# Patient Record
Sex: FEMALE | Race: WHITE | Employment: FULL TIME | URBAN - METROPOLITAN AREA
[De-identification: names, ages, dates, MRNs, and addresses within clinical notes are randomized per-mention and may not be internally consistent; named-entity substitution may affect disease eponyms.]

---

## 2017-05-05 ENCOUNTER — HOSPITAL ENCOUNTER (OUTPATIENT)
Age: 37
Discharge: HOME | End: 2017-05-05
Attending: FAMILY MEDICINE
Payer: COMMERCIAL

## 2017-05-05 VITALS
TEMPERATURE: 98 F | RESPIRATION RATE: 18 BRPM | WEIGHT: 293 LBS | DIASTOLIC BLOOD PRESSURE: 117 MMHG | HEART RATE: 111 BPM | SYSTOLIC BLOOD PRESSURE: 157 MMHG | BODY MASS INDEX: 41.95 KG/M2 | HEIGHT: 70 IN | OXYGEN SATURATION: 98 %

## 2017-05-05 DIAGNOSIS — J02.0 STREPTOCOCCAL SORE THROAT: Primary | ICD-10-CM

## 2017-05-05 PROCEDURE — 87430 STREP A AG IA: CPT | Performed by: FAMILY MEDICINE

## 2017-05-05 PROCEDURE — 99203 OFFICE O/P NEW LOW 30 MIN: CPT

## 2017-05-05 PROCEDURE — 99204 OFFICE O/P NEW MOD 45 MIN: CPT

## 2017-05-05 RX ORDER — AZITHROMYCIN 250 MG/1
TABLET, FILM COATED ORAL
Qty: 1 PACKAGE | Refills: 0 | Status: SHIPPED | OUTPATIENT
Start: 2017-05-05 | End: 2017-05-10

## 2017-05-05 NOTE — ED PROVIDER NOTES
Patient Seen in: [facility]    History   Patient presents with:  Cough/URI    Stated Complaint: cough, sore throat, ichy throat x3 days     HPI    Patient is a 78-year-old female.   Coming in today with complaint of cough, s Genitourinary: Negative. Musculoskeletal: Positive for myalgias. Negative for back pain, joint swelling, arthralgias, gait problem, neck pain and neck stiffness. Skin: Negative. Neurological: Negative.         Positive for stated complaint: cough, cervical adenopathy. Neurological: She is alert and oriented to person, place, and time. Skin: Skin is warm and dry. No rash noted. She is not diaphoretic. No erythema. No pallor. Nursing note and vitals reviewed.             ED Course     Labs Review

## 2017-05-05 NOTE — ED NOTES
Pt educated to not take mucinex & multi symptom cold together, safe OTC meds were recommended to pt by Dr. Sara Ramsey for people who have HTN.

## 2017-05-05 NOTE — ED INITIAL ASSESSMENT (HPI)
Pt c/o productive cough with yellow sputum since last night, post nasal drip and feeling of fluid in ears. Denies ABHAY, fever or chills.

## 2017-10-24 ENCOUNTER — OFFICE VISIT (OUTPATIENT)
Dept: FAMILY MEDICINE CLINIC | Facility: CLINIC | Age: 37
End: 2017-10-24

## 2017-10-24 VITALS
BODY MASS INDEX: 41.02 KG/M2 | WEIGHT: 293 LBS | OXYGEN SATURATION: 98 % | RESPIRATION RATE: 14 BRPM | DIASTOLIC BLOOD PRESSURE: 82 MMHG | TEMPERATURE: 99 F | HEART RATE: 112 BPM | HEIGHT: 71 IN | SYSTOLIC BLOOD PRESSURE: 140 MMHG

## 2017-10-24 DIAGNOSIS — J02.9 SORE THROAT: ICD-10-CM

## 2017-10-24 DIAGNOSIS — H65.192 OTHER ACUTE NONSUPPURATIVE OTITIS MEDIA OF LEFT EAR, RECURRENCE NOT SPECIFIED: Primary | ICD-10-CM

## 2017-10-24 PROCEDURE — 99202 OFFICE O/P NEW SF 15 MIN: CPT | Performed by: NURSE PRACTITIONER

## 2017-10-24 PROCEDURE — 87880 STREP A ASSAY W/OPTIC: CPT | Performed by: NURSE PRACTITIONER

## 2017-10-24 RX ORDER — AZITHROMYCIN 250 MG/1
TABLET, FILM COATED ORAL
Qty: 6 TABLET | Refills: 0 | Status: SHIPPED | OUTPATIENT
Start: 2017-10-24 | End: 2017-12-09 | Stop reason: ALTCHOICE

## 2017-10-25 NOTE — PATIENT INSTRUCTIONS
· It is very important to complete full course of antibiotic.    · Rest and fluids  · Acetaminophen or ibuprofen according to package instructions as needed for pain  · Call or return if symptoms worsen, do not improve in 3 days, or if fever of 100.4 or gre antibiotics. Bacteria can become resistant to antibiotics, and the medicine may cause side effects. For these reasons, your healthcare provider may wait 1 to 3 days to see if the symptoms go away on their own before prescribing an antibiotic.    Your provid ibuprofen to control your fever. Anyone under the age of 24 with a viral illness should not take aspirin because of the increased risk of Reye's syndrome. Keep a daily record of your temperature.    Call your healthcare provider if you have:   a temperatu

## 2017-10-25 NOTE — PROGRESS NOTES
CHIEF COMPLAINT:   Patient presents with:  Sore Throat       HPI:   Robert Sturat is a 29year old female presents to clinic with symptoms of sore throat. Patient has had for 2 days. Symptoms have been consistent since onset.   Patient reports following ass muffled voice, stridor, or uvular deviation. NECK: supple, non-tender  LUNGS: clear to auscultation bilaterally; no wheezes, rales, or rhonchi. Breathing is non labored.   CARDIO: RRR without murmur  GI: good BS's,no masses, hepatosplenomegaly, or tender a viral infection of the nose and throat. For example, a cold might lead to an infection of the ear. Ear infections may also occur when you have allergies.  The viral infection or allergic reaction can cause swelling of the tube between your ear and throat most cases you should feel better in 2 to 3 days. If you are taking an antibiotic and your eardrum has not returned to normal when your provider examines you again, you may need to take a different antibiotic or other medicine.  In this case, it may take more follow-up exams until signs of inflammation and infection have disappeared. How can I prevent an ear infection from occurring? If you tend to get ear infections often, ask your healthcare provider if you need to be checked for allergies.  Getting t

## 2017-12-09 ENCOUNTER — OFFICE VISIT (OUTPATIENT)
Dept: FAMILY MEDICINE CLINIC | Facility: CLINIC | Age: 37
End: 2017-12-09

## 2017-12-09 VITALS
OXYGEN SATURATION: 98 % | HEART RATE: 77 BPM | SYSTOLIC BLOOD PRESSURE: 150 MMHG | TEMPERATURE: 99 F | DIASTOLIC BLOOD PRESSURE: 78 MMHG | RESPIRATION RATE: 18 BRPM | WEIGHT: 293 LBS | BODY MASS INDEX: 45 KG/M2

## 2017-12-09 DIAGNOSIS — K52.9 GASTROENTERITIS: Primary | ICD-10-CM

## 2017-12-09 DIAGNOSIS — R03.0 ELEVATED BP WITHOUT DIAGNOSIS OF HYPERTENSION: ICD-10-CM

## 2017-12-09 PROCEDURE — 99213 OFFICE O/P EST LOW 20 MIN: CPT | Performed by: NURSE PRACTITIONER

## 2017-12-09 NOTE — PROGRESS NOTES
CHIEF COMPLAINT:   Patient presents with:  Flu: C/O possible stomach flu. Symptoms x 3-4 days. HPI:   Jennifer Soria is a 40year old female who presents for complaints of stomach flu type symptoms for about 4 days.   Had diarrhea/body aches/chills f congestion, or sore throat  CARDIOVASCULAR: denies chest pain or palpitations  RESPIRATORY: denies shortness of breath, cough or wheezing  GI:See HPI  NEURO: denies headaches, loss of bowel or bladder control    EXAM:   /78   Pulse 77   Temp 98.8 °F her recent ED visit as well. This was closely monitored w/ her last PCP she says. - Resume home monitoring.  - Highly encouraged to establish care w/ a new PCP- list given.   Per pt, recently moved here from [facility] ED if ever associated chest roberto

## 2017-12-18 ENCOUNTER — OFFICE VISIT (OUTPATIENT)
Dept: FAMILY MEDICINE CLINIC | Facility: CLINIC | Age: 37
End: 2017-12-18

## 2017-12-18 VITALS
BODY MASS INDEX: 42.42 KG/M2 | HEIGHT: 69.5 IN | SYSTOLIC BLOOD PRESSURE: 130 MMHG | RESPIRATION RATE: 18 BRPM | HEART RATE: 100 BPM | TEMPERATURE: 98 F | WEIGHT: 293 LBS | DIASTOLIC BLOOD PRESSURE: 90 MMHG

## 2017-12-18 DIAGNOSIS — A08.4 VIRAL GASTROENTERITIS: Primary | ICD-10-CM

## 2017-12-18 DIAGNOSIS — M54.6 ACUTE LEFT-SIDED THORACIC BACK PAIN: ICD-10-CM

## 2017-12-18 PROCEDURE — 99203 OFFICE O/P NEW LOW 30 MIN: CPT | Performed by: FAMILY MEDICINE

## 2017-12-18 RX ORDER — ETODOLAC 400 MG/1
400 TABLET, FILM COATED ORAL 2 TIMES DAILY
Qty: 28 TABLET | Refills: 0 | Status: SHIPPED | OUTPATIENT
Start: 2017-12-18 | End: 2018-01-01

## 2017-12-18 NOTE — PROGRESS NOTES
[facility] Family Medicine Office Note  Chief Complaint:   Patient presents with:  Urgent Care F/u: seen 12/9/17 in urgent care, abd pain      HPI:   This is a 40year old female coming in for establishment of care and follow up of urgent care vi pain, chest pressure or chest discomfort. No palpitations or edema.   Denies any dyspnea on exertion or at rest  RESPIRATORY:  Denies shortness of breath, cough  GASTROINTESTINAL:  Denies any abdominal pain, nausea, vomiting, diarrhea  NEUROLOGICAL:  Denies Health Maintenance:  Annual Physical due on 11/30/1983  Annual Depression Screen due on 11/30/1993  Pap Smear,3 Years due on 11/30/2012  Influenza Vaccine(1) due on 09/01/2017    Patient/Caregiver Education: Patient/Caregiver Education: There are n

## 2018-01-21 ENCOUNTER — APPOINTMENT (OUTPATIENT)
Dept: CT IMAGING | Facility: HOSPITAL | Age: 38
End: 2018-01-21
Attending: EMERGENCY MEDICINE
Payer: COMMERCIAL

## 2018-01-21 ENCOUNTER — APPOINTMENT (OUTPATIENT)
Dept: GENERAL RADIOLOGY | Facility: HOSPITAL | Age: 38
End: 2018-01-21
Attending: EMERGENCY MEDICINE
Payer: COMMERCIAL

## 2018-01-21 ENCOUNTER — HOSPITAL ENCOUNTER (EMERGENCY)
Facility: HOSPITAL | Age: 38
Discharge: HOME | End: 2018-01-21
Attending: EMERGENCY MEDICINE
Payer: COMMERCIAL

## 2018-01-21 VITALS
DIASTOLIC BLOOD PRESSURE: 90 MMHG | BODY MASS INDEX: 41.95 KG/M2 | RESPIRATION RATE: 14 BRPM | HEIGHT: 70 IN | SYSTOLIC BLOOD PRESSURE: 153 MMHG | HEART RATE: 83 BPM | TEMPERATURE: 98 F | OXYGEN SATURATION: 96 % | WEIGHT: 293 LBS

## 2018-01-21 DIAGNOSIS — R06.02 SHORTNESS OF BREATH: Primary | ICD-10-CM

## 2018-01-21 DIAGNOSIS — I10 HYPERTENSION, UNSPECIFIED TYPE: ICD-10-CM

## 2018-01-21 LAB
ALBUMIN SERPL-MCNC: 3.7 G/DL (ref 3.5–4.8)
ALP LIVER SERPL-CCNC: 74 U/L (ref 37–98)
ALT SERPL-CCNC: 28 U/L (ref 14–54)
AST SERPL-CCNC: 13 U/L (ref 15–41)
BASOPHILS # BLD AUTO: 0.05 X10(3) UL (ref 0–0.1)
BASOPHILS NFR BLD AUTO: 0.4 %
BILIRUB SERPL-MCNC: 0.3 MG/DL (ref 0.1–2)
BILIRUB UR QL STRIP.AUTO: NEGATIVE
BUN BLD-MCNC: 11 MG/DL (ref 8–20)
CALCIUM BLD-MCNC: 9 MG/DL (ref 8.3–10.3)
CHLORIDE: 108 MMOL/L (ref 101–111)
CLARITY UR REFRACT.AUTO: CLEAR
CO2: 25 MMOL/L (ref 22–32)
COLOR UR AUTO: YELLOW
CREAT BLD-MCNC: 0.71 MG/DL (ref 0.55–1.02)
EOSINOPHIL # BLD AUTO: 0.18 X10(3) UL (ref 0–0.3)
EOSINOPHIL NFR BLD AUTO: 1.4 %
ERYTHROCYTE [DISTWIDTH] IN BLOOD BY AUTOMATED COUNT: 14.5 % (ref 11.5–16)
GLUCOSE BLD-MCNC: 99 MG/DL (ref 70–99)
GLUCOSE UR STRIP.AUTO-MCNC: NEGATIVE MG/DL
HCT VFR BLD AUTO: 41.9 % (ref 34–50)
HGB BLD-MCNC: 13.3 G/DL (ref 12–16)
IMMATURE GRANULOCYTE COUNT: 0.05 X10(3) UL (ref 0–1)
IMMATURE GRANULOCYTE RATIO %: 0.4 %
KETONES UR STRIP.AUTO-MCNC: NEGATIVE MG/DL
LYMPHOCYTES # BLD AUTO: 3.08 X10(3) UL (ref 0.9–4)
LYMPHOCYTES NFR BLD AUTO: 24.3 %
M PROTEIN MFR SERPL ELPH: 8.2 G/DL (ref 6.1–8.3)
MCH RBC QN AUTO: 25.9 PG (ref 27–33.2)
MCHC RBC AUTO-ENTMCNC: 31.7 G/DL (ref 31–37)
MCV RBC AUTO: 81.7 FL (ref 81–100)
MONOCYTES # BLD AUTO: 0.55 X10(3) UL (ref 0.1–0.6)
MONOCYTES NFR BLD AUTO: 4.3 %
NEUTROPHIL ABS PRELIM: 8.76 X10 (3) UL (ref 1.3–6.7)
NEUTROPHILS # BLD AUTO: 8.76 X10(3) UL (ref 1.3–6.7)
NEUTROPHILS NFR BLD AUTO: 69.2 %
NITRITE UR QL STRIP.AUTO: NEGATIVE
PH UR STRIP.AUTO: 5 [PH] (ref 4.5–8)
PLATELET # BLD AUTO: 419 10(3)UL (ref 150–450)
POCT LOT NUMBER: NORMAL
POCT URINE PREGNANCY: NEGATIVE
POTASSIUM SERPL-SCNC: 3.7 MMOL/L (ref 3.6–5.1)
PRO-BETA NATRIURETIC PEPTIDE: 82 PG/ML (ref ?–125)
PROT UR STRIP.AUTO-MCNC: NEGATIVE MG/DL
RBC # BLD AUTO: 5.13 X10(6)UL (ref 3.8–5.1)
RBC UR QL AUTO: NEGATIVE
RED CELL DISTRIBUTION WIDTH-SD: 42.9 FL (ref 35.1–46.3)
SODIUM SERPL-SCNC: 141 MMOL/L (ref 136–144)
SP GR UR STRIP.AUTO: 1.02 (ref 1–1.03)
TROPONIN: <0.046 NG/ML (ref ?–0.05)
TROPONIN: <0.046 NG/ML (ref ?–0.05)
TSI SER-ACNC: 2.9 MIU/ML (ref 0.35–5.5)
UROBILINOGEN UR STRIP.AUTO-MCNC: <2 MG/DL
WBC # BLD AUTO: 12.7 X10(3) UL (ref 4–13)

## 2018-01-21 PROCEDURE — 81001 URINALYSIS AUTO W/SCOPE: CPT | Performed by: EMERGENCY MEDICINE

## 2018-01-21 PROCEDURE — 99285 EMERGENCY DEPT VISIT HI MDM: CPT

## 2018-01-21 PROCEDURE — 71275 CT ANGIOGRAPHY CHEST: CPT | Performed by: EMERGENCY MEDICINE

## 2018-01-21 PROCEDURE — 84443 ASSAY THYROID STIM HORMONE: CPT | Performed by: EMERGENCY MEDICINE

## 2018-01-21 PROCEDURE — 36415 COLL VENOUS BLD VENIPUNCTURE: CPT

## 2018-01-21 PROCEDURE — 87086 URINE CULTURE/COLONY COUNT: CPT | Performed by: EMERGENCY MEDICINE

## 2018-01-21 PROCEDURE — 81025 URINE PREGNANCY TEST: CPT

## 2018-01-21 PROCEDURE — 71045 X-RAY EXAM CHEST 1 VIEW: CPT | Performed by: EMERGENCY MEDICINE

## 2018-01-21 PROCEDURE — 85025 COMPLETE CBC W/AUTO DIFF WBC: CPT | Performed by: EMERGENCY MEDICINE

## 2018-01-21 PROCEDURE — 83880 ASSAY OF NATRIURETIC PEPTIDE: CPT | Performed by: EMERGENCY MEDICINE

## 2018-01-21 PROCEDURE — 84484 ASSAY OF TROPONIN QUANT: CPT | Performed by: EMERGENCY MEDICINE

## 2018-01-21 PROCEDURE — 80053 COMPREHEN METABOLIC PANEL: CPT | Performed by: EMERGENCY MEDICINE

## 2018-01-21 PROCEDURE — 93010 ELECTROCARDIOGRAM REPORT: CPT

## 2018-01-21 PROCEDURE — 93005 ELECTROCARDIOGRAM TRACING: CPT

## 2018-01-21 RX ORDER — HYDROMORPHONE HYDROCHLORIDE 1 MG/ML
INJECTION, SOLUTION INTRAMUSCULAR; INTRAVENOUS; SUBCUTANEOUS
Status: DISCONTINUED
Start: 2018-01-21 | End: 2018-01-21 | Stop reason: WASHOUT

## 2018-01-21 NOTE — ED INITIAL ASSESSMENT (HPI)
Pt complains of SOB for the last couple of days. She has a stuffy nose and \"clogged ears. \" Today she had some pressure under her breasts. The pain under her breast is intermittent, it has not gotten worse. Denies nausea.

## 2018-01-21 NOTE — ED PROVIDER NOTES
Patient Seen in: [facility] Emergency Department    History   Patient presents with:  Fatigue (constitutional, neurologic)  Dyspnea ABHAY SOB (respiratory)    Stated Complaint: fatigue    HPI    28-year-old with a history of recurrent bronchitis and o normal limits. Mucous members are moist.   Cardiovascular: Tachycardia, regular rhythm, normal S1-S2. Respiratory: Lungs are clear to auscultation bilaterally. Abdomen: Soft, nontender, nondistended. Back: No CVA tenderness.    Extremities: No pitting T-wave inversions    Chest x-ray: No acute process. Calcified granuloma. FINDINGS:  Cardiac size and pulmonary vasculature are within normal limits. No pleural effusions. No pneumothorax.   Calcified granuloma.        =====  CONCLUSION:  No acute findings normal.  Remainder of labs are unremarkable. Well-appearing 18-year-old presents with some mild shortness of breath and fatigue, other than T-wave inversions on her EKG and possible left atrial enlargement on her EKG her workup is quite reassuring.   She

## 2018-01-22 LAB
ATRIAL RATE: 112 BPM
ATRIAL RATE: 95 BPM
P AXIS: 28 DEGREES
P AXIS: 40 DEGREES
P-R INTERVAL: 150 MS
P-R INTERVAL: 164 MS
Q-T INTERVAL: 326 MS
Q-T INTERVAL: 354 MS
QRS DURATION: 74 MS
QRS DURATION: 76 MS
QTC CALCULATION (BEZET): 444 MS
QTC CALCULATION (BEZET): 444 MS
R AXIS: -10 DEGREES
R AXIS: 23 DEGREES
T AXIS: -18 DEGREES
T AXIS: 2 DEGREES
VENTRICULAR RATE: 112 BPM
VENTRICULAR RATE: 95 BPM

## 2018-01-24 NOTE — ED PROVIDER NOTES
I called the patient at home to find out how she was feeling. She states she is feeling okay. She still having some soreness under her breasts bilaterally. Her breathing is okay. No other new or worsening symptoms.   She made an appointment to follow-up

## 2018-01-31 ENCOUNTER — TELEPHONE (OUTPATIENT)
Dept: FAMILY MEDICINE CLINIC | Facility: CLINIC | Age: 38
End: 2018-01-31

## 2018-01-31 ENCOUNTER — OFFICE VISIT (OUTPATIENT)
Dept: FAMILY MEDICINE CLINIC | Facility: CLINIC | Age: 38
End: 2018-01-31

## 2018-01-31 VITALS
DIASTOLIC BLOOD PRESSURE: 84 MMHG | TEMPERATURE: 97 F | RESPIRATION RATE: 16 BRPM | HEIGHT: 69.5 IN | SYSTOLIC BLOOD PRESSURE: 130 MMHG | BODY MASS INDEX: 42.42 KG/M2 | HEART RATE: 96 BPM | WEIGHT: 293 LBS

## 2018-01-31 DIAGNOSIS — R07.81 RIB PAIN ON LEFT SIDE: Primary | ICD-10-CM

## 2018-01-31 PROCEDURE — 99214 OFFICE O/P EST MOD 30 MIN: CPT | Performed by: FAMILY MEDICINE

## 2018-01-31 RX ORDER — ETODOLAC 400 MG/1
400 TABLET, FILM COATED ORAL 2 TIMES DAILY
Qty: 28 TABLET | Refills: 0 | Status: SHIPPED | OUTPATIENT
Start: 2018-01-31 | End: 2018-02-14

## 2018-01-31 NOTE — TELEPHONE ENCOUNTER
Lm for pt to cb. This may be f/u on her recent ER and cardio visit. I stated pt should keep her appt today if she is not able to reach us prior.

## 2018-01-31 NOTE — PROGRESS NOTES
[facility] Family Medicine Office Note  Chief Complaint:   Patient presents with:  ER F/U: f/u rib pain      HPI:   This is a 40year old female coming in for ER follow-up for left-sided rib pain and chest pain.   Patient had an extensive workup o on exertion or at rest  RESPIRATORY:  Denies shortness of breath, cough  GASTROINTESTINAL:  Denies any abdominal pain, nausea, vomiting, constipation, diarrhea. + left flank pain  NEUROLOGICAL:  Denies headache, dizziness, syncope.   MUSCULOSKELETAL:  Chauncey Pfeiffer Depression Screen due on 11/30/1993  Pap Smear,3 Years due on 11/30/2012  Influenza Vaccine(1) due on 09/01/2017    Patient/Caregiver Education: Patient/Caregiver Education: There are no barriers to learning. Medical education done.    Outcome: Patient verb

## 2018-01-31 NOTE — TELEPHONE ENCOUNTER
Pt called back and confirmed below. Still having soreness under breast area, wonders if stress related. Saw cardio yesterday. Pt in no current distress. She will keep appt.

## 2018-01-31 NOTE — TELEPHONE ENCOUNTER
Pt made appt on mychart for back pain and in rib area   s/w triage as appt is soon advised to talk to Dr. Elizabeth Infante he advised to triage pt     Message     Appointment For: Petrona Freedman ([id number])   Visit Type: Connie Torres ([id number])      1/31/2018    3:15 PM

## 2018-02-07 ENCOUNTER — OFFICE VISIT (OUTPATIENT)
Dept: FAMILY MEDICINE CLINIC | Facility: CLINIC | Age: 38
End: 2018-02-07

## 2018-02-07 ENCOUNTER — APPOINTMENT (OUTPATIENT)
Dept: LAB | Age: 38
End: 2018-02-07
Attending: FAMILY MEDICINE
Payer: COMMERCIAL

## 2018-02-07 VITALS
RESPIRATION RATE: 16 BRPM | BODY MASS INDEX: 42.42 KG/M2 | SYSTOLIC BLOOD PRESSURE: 130 MMHG | TEMPERATURE: 98 F | DIASTOLIC BLOOD PRESSURE: 80 MMHG | HEART RATE: 68 BPM | WEIGHT: 293 LBS | HEIGHT: 69.5 IN

## 2018-02-07 DIAGNOSIS — E66.01 MORBID OBESITY WITH BMI OF 45.0-49.9, ADULT (HCC): ICD-10-CM

## 2018-02-07 DIAGNOSIS — Z00.00 ROUTINE GENERAL MEDICAL EXAMINATION AT A HEALTH CARE FACILITY: ICD-10-CM

## 2018-02-07 DIAGNOSIS — Z00.00 ROUTINE GENERAL MEDICAL EXAMINATION AT A HEALTH CARE FACILITY: Primary | ICD-10-CM

## 2018-02-07 DIAGNOSIS — E55.9 VITAMIN D DEFICIENCY: Primary | ICD-10-CM

## 2018-02-07 LAB
25-HYDROXYVITAMIN D (TOTAL): 11.5 NG/ML (ref 30–100)
CHOLEST SMN-MCNC: 135 MG/DL (ref ?–200)
HDLC SERPL-MCNC: 42 MG/DL (ref 45–?)
HDLC SERPL: 3.21 {RATIO} (ref ?–4.44)
LDLC SERPL CALC-MCNC: 80 MG/DL (ref ?–130)
NONHDLC SERPL-MCNC: 93 MG/DL (ref ?–130)
TRIGL SERPL-MCNC: 65 MG/DL (ref ?–150)
VLDLC SERPL CALC-MCNC: 13 MG/DL (ref 5–40)

## 2018-02-07 PROCEDURE — 99395 PREV VISIT EST AGE 18-39: CPT | Performed by: FAMILY MEDICINE

## 2018-02-07 PROCEDURE — 36415 COLL VENOUS BLD VENIPUNCTURE: CPT | Performed by: FAMILY MEDICINE

## 2018-02-07 PROCEDURE — 82306 VITAMIN D 25 HYDROXY: CPT | Performed by: FAMILY MEDICINE

## 2018-02-07 PROCEDURE — 80061 LIPID PANEL: CPT | Performed by: FAMILY MEDICINE

## 2018-02-07 RX ORDER — ERGOCALCIFEROL 1.25 MG/1
50000 CAPSULE ORAL WEEKLY
Qty: 24 CAPSULE | Refills: 0 | Status: SHIPPED | OUTPATIENT
Start: 2018-02-07 | End: 2018-03-09

## 2018-02-07 NOTE — PROGRESS NOTES
HPI:   Marj Fitzgerald is a 40year old female who presents for a complete physical exam. Symptoms: denies discharge, itching, burning or dysuria, periods are regular. Patient complains of nothing today. Denies any recent illnesses or hospitalizations.   Lenny Bustillo Leukocyte Esterase Urine Trace (A) Negative   WBC Urine 1-4 <5 /HPF   RBC URINE 0-2 0 - 2 /HPF   Bacteria Urine None Seen None Seen   Squamous Epi.  Cells Small Small /LPF   Renal Tubular Epithelial Cells None Seen Small /LPF   Transitional Cells None See 3. 08 0.90 - 4.00 x10(3) uL   Monocyte Absolute 0.55 0.10 - 0.60 x10(3) uL   Eosinophil Absolute 0.18 0.00 - 0.30 x10(3) uL   Basophil Absolute 0.05 0.00 - 0.10 x10(3) uL   Immature Granulocyte Absolute 0.05 0.00 - 1.00 x10(3) uL   Neutrophil % 69.2 %   Lym Position: Sitting, Cuff Size: adult)   Pulse 68   Temp 98.1 °F (36.7 °C) (Oral)   Resp 16   Ht 69.5\"   Wt (!) 314 lb   LMP 01/13/2018   BMI 45.70 kg/m²   Body mass index is 45.7 kg/m².    GENERAL: well developed, morbidly obese, in no apparent distress  SK

## 2018-04-02 ENCOUNTER — HOSPITAL ENCOUNTER (OUTPATIENT)
Dept: CT IMAGING | Facility: HOSPITAL | Age: 38
Discharge: HOME | End: 2018-04-02
Attending: INTERNAL MEDICINE
Payer: COMMERCIAL

## 2018-04-02 DIAGNOSIS — R07.9 CHEST PAIN, UNSPECIFIED TYPE: ICD-10-CM

## 2018-04-02 DIAGNOSIS — R94.39 EQUIVOCAL STRESS TEST: ICD-10-CM

## 2018-04-02 DIAGNOSIS — R94.31 ABNORMAL EKG: ICD-10-CM

## 2018-04-02 PROCEDURE — 82565 ASSAY OF CREATININE: CPT

## 2018-04-02 PROCEDURE — 75574 CT ANGIO HRT W/3D IMAGE: CPT | Performed by: INTERNAL MEDICINE

## 2018-04-02 RX ORDER — NITROGLYCERIN 0.4 MG/1
TABLET SUBLINGUAL
Status: COMPLETED
Start: 2018-04-02 | End: 2018-04-02

## 2018-04-02 RX ADMIN — NITROGLYCERIN 0.4 MG: 0.4 TABLET SUBLINGUAL at 09:45:00

## 2018-11-21 ENCOUNTER — OFFICE VISIT (OUTPATIENT)
Dept: FAMILY MEDICINE CLINIC | Facility: CLINIC | Age: 38
End: 2018-11-21
Payer: COMMERCIAL

## 2018-11-21 VITALS
SYSTOLIC BLOOD PRESSURE: 146 MMHG | HEART RATE: 90 BPM | HEIGHT: 70 IN | WEIGHT: 293 LBS | OXYGEN SATURATION: 99 % | DIASTOLIC BLOOD PRESSURE: 82 MMHG | BODY MASS INDEX: 41.95 KG/M2 | TEMPERATURE: 98 F

## 2018-11-21 DIAGNOSIS — R03.0 ELEVATED BLOOD PRESSURE READING: ICD-10-CM

## 2018-11-21 DIAGNOSIS — J06.9 VIRAL URI: ICD-10-CM

## 2018-11-21 DIAGNOSIS — H66.001 ACUTE SUPPURATIVE OTITIS MEDIA OF RIGHT EAR WITHOUT SPONTANEOUS RUPTURE OF TYMPANIC MEMBRANE, RECURRENCE NOT SPECIFIED: Primary | ICD-10-CM

## 2018-11-21 PROCEDURE — 99213 OFFICE O/P EST LOW 20 MIN: CPT | Performed by: NURSE PRACTITIONER

## 2018-11-21 RX ORDER — AZITHROMYCIN 250 MG/1
TABLET, FILM COATED ORAL
Qty: 6 TABLET | Refills: 0 | Status: SHIPPED | OUTPATIENT
Start: 2018-11-21 | End: 2019-02-24

## 2018-11-21 NOTE — PATIENT INSTRUCTIONS
Middle Ear Infection (Adult)  You have an infection of the middle ear, the space behind the eardrum. This is also called acute otitis media (AOM). Sometimes it is caused by the common cold.  This is because congestion can block the internal passage (eusta You have a viral upper respiratory illness (URI), which is another term for the common cold. This illness is contagious during the first few days. It is spread through the air by coughing and sneezing.  It may also be spread by direct contact (touching the · Cough with lots of colored sputum (mucus)  · Severe headache; face, neck, or ear pain  · Difficulty swallowing due to throat pain  · Fever of 100.4°F (38°C) or higher, or as directed by your healthcare provider  Call 911  Call 911 if any of these occur:

## 2018-11-21 NOTE — PROGRESS NOTES
CHIEF COMPLAINT:   Patient presents with:  Ear Problem: trouble hearing from right ear, dry and itchy throat, drainage x 5 dys       HPI:   Polo Gurrola is a 40year old female who presents for upper respiratory symptoms for  4 days.  Patient re THROAT: Oral mucosa pink, moist. Posterior pharynx is non erythematous. no1 exudates. Tonsils 1/4. NECK: Supple, non-tender  LUNGS: clear to auscultation bilaterally, no wheezes or rhonchi. Breathing is non labored.   CARDIO: RRR without murmur  EXTREMIT You have an infection of the middle ear, the space behind the eardrum. This is also called acute otitis media (AOM). Sometimes it is caused by the common cold.  This is because congestion can block the internal passage (eustachian tube) that drains fluid fr You have a viral upper respiratory illness (URI), which is another term for the common cold. This illness is contagious during the first few days. It is spread through the air by coughing and sneezing.  It may also be spread by direct contact (touching the · Cough with lots of colored sputum (mucus)  · Severe headache; face, neck, or ear pain  · Difficulty swallowing due to throat pain  · Fever of 100.4°F (38°C) or higher, or as directed by your healthcare provider  Call 911  Call 911 if any of these occur:

## 2019-02-24 ENCOUNTER — OFFICE VISIT (OUTPATIENT)
Dept: FAMILY MEDICINE CLINIC | Facility: CLINIC | Age: 39
End: 2019-02-24
Payer: COMMERCIAL

## 2019-02-24 VITALS
HEIGHT: 70 IN | SYSTOLIC BLOOD PRESSURE: 132 MMHG | OXYGEN SATURATION: 98 % | RESPIRATION RATE: 18 BRPM | TEMPERATURE: 98 F | WEIGHT: 293 LBS | DIASTOLIC BLOOD PRESSURE: 82 MMHG | BODY MASS INDEX: 41.95 KG/M2 | HEART RATE: 82 BPM

## 2019-02-24 DIAGNOSIS — J06.9 VIRAL UPPER RESPIRATORY TRACT INFECTION: ICD-10-CM

## 2019-02-24 DIAGNOSIS — R05.9 COUGH: Primary | ICD-10-CM

## 2019-02-24 PROCEDURE — 99213 OFFICE O/P EST LOW 20 MIN: CPT | Performed by: FAMILY MEDICINE

## 2019-02-26 NOTE — PROGRESS NOTES
CHIEF COMPLAINT:   Patient presents with:  Cough: intermittently productive, worse in the AM, some PND - x9 days      HPI:   Barbara Jimenez is a 41year old female who presents for upper respiratory symptoms for  9 days.  Patient reports sore thro intact  EARS: TM's pearly, no bulging, noretraction,no fluid, bony landmarks present  NOSE: Nostrils patent, clear nasal discharge, nasal mucosa pink and boggy  THROAT: Oral mucosa pink, moist. Posterior pharynx is not erythematous. no exudates.  Tonsils 2/

## 2019-02-26 NOTE — PATIENT INSTRUCTIONS
Continue to monitor symptoms for now. Use otc meds for cough:  Delsym for cough, benadryl at bedtime to reduce drainage if needed. Warm tea with honey, cough lozenges, vaporizers.    Consider applying gold's vapo-rub or eucalpytus oil to chest and feet

## 2019-09-23 ENCOUNTER — TELEPHONE (OUTPATIENT)
Dept: FAMILY MEDICINE CLINIC | Facility: CLINIC | Age: 39
End: 2019-09-23

## 2019-09-23 ENCOUNTER — OFFICE VISIT (OUTPATIENT)
Dept: FAMILY MEDICINE CLINIC | Facility: CLINIC | Age: 39
End: 2019-09-23
Payer: COMMERCIAL

## 2019-09-23 VITALS
RESPIRATION RATE: 24 BRPM | WEIGHT: 293 LBS | DIASTOLIC BLOOD PRESSURE: 82 MMHG | SYSTOLIC BLOOD PRESSURE: 132 MMHG | HEART RATE: 88 BPM | HEIGHT: 70 IN | BODY MASS INDEX: 41.95 KG/M2

## 2019-09-23 DIAGNOSIS — S09.90XA INJURY OF HEAD, INITIAL ENCOUNTER: ICD-10-CM

## 2019-09-23 DIAGNOSIS — R51.9 PRESSURE IN HEAD: Primary | ICD-10-CM

## 2019-09-23 PROCEDURE — 99214 OFFICE O/P EST MOD 30 MIN: CPT | Performed by: FAMILY MEDICINE

## 2019-09-23 RX ORDER — CEFDINIR 300 MG/1
300 CAPSULE ORAL 2 TIMES DAILY
Qty: 20 CAPSULE | Refills: 0 | Status: SHIPPED | OUTPATIENT
Start: 2019-09-23 | End: 2019-10-03

## 2019-09-23 RX ORDER — METHYLPREDNISOLONE 4 MG/1
TABLET ORAL
Qty: 1 KIT | Refills: 0 | Status: SHIPPED | OUTPATIENT
Start: 2019-09-23 | End: 2021-02-05 | Stop reason: ALTCHOICE

## 2019-09-23 NOTE — PROGRESS NOTES
CT of the head was negative for any acute intracranial abnormality. There were signs of possible sinusitis. Patient was started on Omnicef and Medrol Dosepak.

## 2019-09-23 NOTE — PROGRESS NOTES
[facility] Family Medicine Office Note  Chief Complaint:   Patient presents with:  Dizziness: x2 weeks       HPI:   This is a 41year old female coming in for head injury and pressure for the last 2 weeks.   Patient had had a few episodes of dizzi any dyspnea on exertion or at rest  RESPIRATORY:  Denies shortness of breath, cough  GASTROINTESTINAL:  Denies any abdominal pain, nausea, vomiting  NEUROLOGICAL:  + head pressure, denies dizziness, syncope, numbness or tingling in the extremities.   [place] F[place] Visit:  Requested Prescriptions      No prescriptions requested or ordered in this encounter       Health Maintenance:  Pap Smear,3 Years due on 11/30/2012  Annual Depression Screen due on 01/31/2019  Annual Physical due on 02/07/2019  Influenza Vaccine(1)

## 2019-09-23 NOTE — TELEPHONE ENCOUNTER
Dr. Mendy Morocho called with stat results on the CT of the head for pt. No acute changes, no bleed etc. The only finding is suggestive of left sphenoid sinusitis.  Secretions and air bubbles seen in the left sphenoid sinus other wise normal exam. Report will be f

## 2020-03-26 ENCOUNTER — TELEPHONE (OUTPATIENT)
Dept: FAMILY MEDICINE CLINIC | Facility: CLINIC | Age: 40
End: 2020-03-26

## 2020-03-26 DIAGNOSIS — J01.11 ACUTE RECURRENT FRONTAL SINUSITIS: Primary | ICD-10-CM

## 2020-03-26 PROCEDURE — G2012 BRIEF CHECK IN BY MD/QHP: HCPCS | Performed by: FAMILY MEDICINE

## 2020-03-26 RX ORDER — CEFDINIR 300 MG/1
300 CAPSULE ORAL 2 TIMES DAILY
Qty: 20 CAPSULE | Refills: 0 | Status: SHIPPED | OUTPATIENT
Start: 2020-03-26 | End: 2020-03-27 | Stop reason: ALTCHOICE

## 2020-03-26 NOTE — TELEPHONE ENCOUNTER
Virtual/Telephone Check-In    Leatha Vazquez verbally consents to a Virtual/Telephone Check-In service on 03/26/20.   Patient understands and accepts financial responsibility for any deductible, co-insurance and/or co-pays associated with this ser

## 2020-03-26 NOTE — TELEPHONE ENCOUNTER
Patient would like a tele-visit with provider regarding:  Sinus pressure    Patient has given consent for this visit and SCHEDULED FOR:   Today at 11:30    Patient preferred phone #:   [phone]

## 2020-03-27 ENCOUNTER — TELEPHONE (OUTPATIENT)
Dept: FAMILY MEDICINE CLINIC | Facility: CLINIC | Age: 40
End: 2020-03-27

## 2020-03-27 RX ORDER — DOXYCYCLINE HYCLATE 100 MG
100 TABLET ORAL 2 TIMES DAILY
Qty: 20 TABLET | Refills: 0 | Status: SHIPPED | OUTPATIENT
Start: 2020-03-27 | End: 2020-04-06

## 2020-03-27 NOTE — TELEPHONE ENCOUNTER
Pt was given cefdinir 300 MG Oral Cap for sinus inf she was extremely restless last nigh and even had a slight headache. She would like to know if she can try something different. Please advise. Thank you.

## 2020-03-27 NOTE — TELEPHONE ENCOUNTER
See note below, pt states she was restless last night, jittery, took med at 6 ;30 pm  Feels better this morning. , would like to try different med Please advise.

## 2020-12-02 ENCOUNTER — TELEMEDICINE (OUTPATIENT)
Dept: FAMILY MEDICINE CLINIC | Facility: CLINIC | Age: 40
End: 2020-12-02
Payer: COMMERCIAL

## 2020-12-02 DIAGNOSIS — J01.00 ACUTE NON-RECURRENT MAXILLARY SINUSITIS: Primary | ICD-10-CM

## 2020-12-02 PROCEDURE — 99214 OFFICE O/P EST MOD 30 MIN: CPT | Performed by: FAMILY MEDICINE

## 2020-12-02 RX ORDER — DOXYCYCLINE HYCLATE 100 MG
100 TABLET ORAL 2 TIMES DAILY
Qty: 20 TABLET | Refills: 0 | Status: SHIPPED | OUTPATIENT
Start: 2020-12-02 | End: 2020-12-12

## 2020-12-02 NOTE — PROGRESS NOTES
Subjective     HPI:     Telehealth outside of [address] Verbal Consent   I conducted a telehealth visit with Memo Cheung today, 12/02/20, which was completed using two-way, real-time interactive audio and video communication.  This ha complains of sinus pressure and headaches over the last month. She typically gets about one sinus infection per year. She denies any significant cough. She is having a lot of pressure in her left ear along with pain. Denies any fever.   Denies any diffi

## 2020-12-11 ENCOUNTER — TELEMEDICINE (OUTPATIENT)
Dept: FAMILY MEDICINE CLINIC | Facility: CLINIC | Age: 40
End: 2020-12-11
Payer: COMMERCIAL

## 2020-12-11 DIAGNOSIS — J01.01 ACUTE RECURRENT MAXILLARY SINUSITIS: Primary | ICD-10-CM

## 2020-12-11 PROCEDURE — 99214 OFFICE O/P EST MOD 30 MIN: CPT | Performed by: FAMILY MEDICINE

## 2020-12-11 RX ORDER — PREDNISONE 20 MG/1
TABLET ORAL
Qty: 20 TABLET | Refills: 0 | Status: SHIPPED | OUTPATIENT
Start: 2020-12-11 | End: 2021-02-05 | Stop reason: ALTCHOICE

## 2020-12-11 NOTE — PROGRESS NOTES
Subjective     HPI:     Telehealth outside of [address] Verbal Consent   I conducted a telehealth visit with [place] Maren today, 12/02/20, which was completed using two-way, real-time interactive audio and video communication.  This ha complains of sinus pressure and headaches over the last month both of which have improved with doxycycline. She typically gets about one sinus infection per year. She denies any significant cough.   She is having some lingering pain near left TMJ and crac

## 2021-02-05 ENCOUNTER — OFFICE VISIT (OUTPATIENT)
Dept: FAMILY MEDICINE CLINIC | Facility: CLINIC | Age: 41
End: 2021-02-05
Payer: COMMERCIAL

## 2021-02-05 VITALS
HEART RATE: 90 BPM | RESPIRATION RATE: 16 BRPM | TEMPERATURE: 97 F | HEIGHT: 70 IN | WEIGHT: 293 LBS | BODY MASS INDEX: 41.95 KG/M2 | DIASTOLIC BLOOD PRESSURE: 80 MMHG | SYSTOLIC BLOOD PRESSURE: 134 MMHG

## 2021-02-05 DIAGNOSIS — L20.9 ATOPIC DERMATITIS, UNSPECIFIED TYPE: Primary | ICD-10-CM

## 2021-02-05 PROCEDURE — 3008F BODY MASS INDEX DOCD: CPT | Performed by: FAMILY MEDICINE

## 2021-02-05 PROCEDURE — 3075F SYST BP GE 130 - 139MM HG: CPT | Performed by: FAMILY MEDICINE

## 2021-02-05 PROCEDURE — 3079F DIAST BP 80-89 MM HG: CPT | Performed by: FAMILY MEDICINE

## 2021-02-05 PROCEDURE — 99214 OFFICE O/P EST MOD 30 MIN: CPT | Performed by: FAMILY MEDICINE

## 2021-02-05 RX ORDER — CLOTRIMAZOLE AND BETAMETHASONE DIPROPIONATE 10; .64 MG/G; MG/G
CREAM TOPICAL
Qty: 45 G | Refills: 1 | Status: SHIPPED | OUTPATIENT
Start: 2021-02-05 | End: 2021-03-04 | Stop reason: ALTCHOICE

## 2021-02-05 NOTE — PROGRESS NOTES
[facility] Family Medicine Office Note  Chief Complaint:   Patient presents with:  Rash: red rash right lower leg      HPI:   This is a 45year old female coming in for a rash. Has had the rash for about 30 days.  The rash is located on the right Temp 97.1 °F (36.2 °C)   Resp 16   Ht 5' 10\" (1.778 m)   Wt (!) 332 lb (150.6 kg)   LMP 02/03/2021 (Exact Date)   BMI 47.64 kg/m²  Estimated body mass index is 47.64 kg/m² as calculated from the following:    Height as of this encounter: 5' 10\" (1.778 m effects or complications from the treatments as a result of today.      Problem List:  Patient Active Problem List:     Morbid obesity with BMI of 45.0-49.9, adult ([facility]      STEPHON MORRIS, DO    Please note that portions of this note may have been com

## 2021-03-04 ENCOUNTER — OFFICE VISIT (OUTPATIENT)
Dept: FAMILY MEDICINE CLINIC | Facility: CLINIC | Age: 41
End: 2021-03-04
Payer: COMMERCIAL

## 2021-03-04 VITALS
WEIGHT: 293 LBS | DIASTOLIC BLOOD PRESSURE: 80 MMHG | HEIGHT: 70 IN | BODY MASS INDEX: 41.95 KG/M2 | RESPIRATION RATE: 20 BRPM | SYSTOLIC BLOOD PRESSURE: 142 MMHG | HEART RATE: 72 BPM

## 2021-03-04 DIAGNOSIS — R06.83 SNORING: ICD-10-CM

## 2021-03-04 DIAGNOSIS — G47.19 EXCESSIVE DAYTIME SLEEPINESS: ICD-10-CM

## 2021-03-04 DIAGNOSIS — S46.819A STRAIN OF TRAPEZIUS MUSCLE, UNSPECIFIED LATERALITY, INITIAL ENCOUNTER: ICD-10-CM

## 2021-03-04 DIAGNOSIS — S39.012A STRAIN OF LUMBAR REGION, INITIAL ENCOUNTER: Primary | ICD-10-CM

## 2021-03-04 PROCEDURE — 3008F BODY MASS INDEX DOCD: CPT | Performed by: FAMILY MEDICINE

## 2021-03-04 PROCEDURE — 99214 OFFICE O/P EST MOD 30 MIN: CPT | Performed by: FAMILY MEDICINE

## 2021-03-04 PROCEDURE — 3077F SYST BP >= 140 MM HG: CPT | Performed by: FAMILY MEDICINE

## 2021-03-04 PROCEDURE — 3079F DIAST BP 80-89 MM HG: CPT | Performed by: FAMILY MEDICINE

## 2021-03-04 RX ORDER — PREDNISONE 20 MG/1
TABLET ORAL
Qty: 20 TABLET | Refills: 0 | Status: SHIPPED | OUTPATIENT
Start: 2021-03-04 | End: 2021-04-05 | Stop reason: ALTCHOICE

## 2021-03-04 RX ORDER — ECHINACEA PURPUREA EXTRACT 125 MG
1 TABLET ORAL AS NEEDED
COMMUNITY

## 2021-03-04 NOTE — PROGRESS NOTES
[facility] Family Medicine Office Note  Chief Complaint:   Patient presents with:  Back Pain: mid-lower      HPI:   This is a 45year old female coming in for low back pain, neck pain and excessive daytime sleepiness.     1.  Low back pain - The p • Fluticasone Propionate (FLONASE ALLERGY RELIEF NA) by Nasal route as needed. • ALBUTEROL SULFATE OR Take by mouth.         Counseling given: Not Answered       REVIEW OF SYSTEMS:   ROS:  CONSTITUTIONAL:  Denies any unusual weight gain/loss, fever, x 3d, 1 tab PO daily x 3d, 1/2 tab PO daily x 3d  Dispense: 20 tablet;  Refill: 0  - OP REFERRAL TO [facility]    2. Strain of trapezius muscle, unspecified laterality, initial encounter  -  New onset  -  Start prednisone  -  Ice/heat a

## 2021-03-05 ENCOUNTER — APPOINTMENT (OUTPATIENT)
Dept: ULTRASOUND IMAGING | Facility: HOSPITAL | Age: 41
End: 2021-03-05
Attending: EMERGENCY MEDICINE
Payer: COMMERCIAL

## 2021-03-05 ENCOUNTER — HOSPITAL ENCOUNTER (EMERGENCY)
Facility: HOSPITAL | Age: 41
Discharge: HOME | End: 2021-03-05
Attending: EMERGENCY MEDICINE
Payer: COMMERCIAL

## 2021-03-05 ENCOUNTER — APPOINTMENT (OUTPATIENT)
Dept: GENERAL RADIOLOGY | Facility: HOSPITAL | Age: 41
End: 2021-03-05
Attending: EMERGENCY MEDICINE
Payer: COMMERCIAL

## 2021-03-05 ENCOUNTER — HOSPITAL ENCOUNTER (OUTPATIENT)
Age: 41
Discharge: ANOTHER HOSPITAL | End: 2021-03-05
Payer: COMMERCIAL

## 2021-03-05 ENCOUNTER — LAB ENCOUNTER (OUTPATIENT)
Dept: LAB | Age: 41
End: 2021-03-05
Attending: FAMILY MEDICINE
Payer: COMMERCIAL

## 2021-03-05 VITALS
RESPIRATION RATE: 18 BRPM | DIASTOLIC BLOOD PRESSURE: 90 MMHG | TEMPERATURE: 98 F | HEIGHT: 70 IN | SYSTOLIC BLOOD PRESSURE: 159 MMHG | BODY MASS INDEX: 41.95 KG/M2 | OXYGEN SATURATION: 97 % | HEART RATE: 70 BPM | WEIGHT: 293 LBS

## 2021-03-05 DIAGNOSIS — M54.9 UPPER BACK PAIN: Primary | ICD-10-CM

## 2021-03-05 DIAGNOSIS — R09.81 SINUS CONGESTION: ICD-10-CM

## 2021-03-05 LAB
ALBUMIN SERPL-MCNC: 3.8 G/DL (ref 3.4–5)
ALBUMIN/GLOB SERPL: 0.9 {RATIO} (ref 1–2)
ALP LIVER SERPL-CCNC: 66 U/L
ALT SERPL-CCNC: 21 U/L
ANION GAP SERPL CALC-SCNC: 7 MMOL/L (ref 0–18)
AST SERPL-CCNC: 10 U/L (ref 15–37)
BASOPHILS # BLD AUTO: 0.02 X10(3) UL (ref 0–0.2)
BASOPHILS NFR BLD AUTO: 0.2 %
BILIRUB SERPL-MCNC: 0.2 MG/DL (ref 0.1–2)
BILIRUB UR QL STRIP.AUTO: NEGATIVE
BUN BLD-MCNC: 10 MG/DL (ref 7–18)
BUN/CREAT SERPL: 11.1 (ref 10–20)
CALCIUM BLD-MCNC: 9.3 MG/DL (ref 8.5–10.1)
CHLORIDE SERPL-SCNC: 107 MMOL/L (ref 98–112)
CO2 SERPL-SCNC: 26 MMOL/L (ref 21–32)
COLOR UR AUTO: YELLOW
CREAT BLD-MCNC: 0.9 MG/DL
D-DIMER: <0.27 UG/ML FEU (ref ?–0.5)
DEPRECATED RDW RBC AUTO: 41.1 FL (ref 35.1–46.3)
EOSINOPHIL # BLD AUTO: 0 X10(3) UL (ref 0–0.7)
EOSINOPHIL NFR BLD AUTO: 0 %
ERYTHROCYTE [DISTWIDTH] IN BLOOD BY AUTOMATED COUNT: 14 % (ref 11–15)
GLOBULIN PLAS-MCNC: 4.3 G/DL (ref 2.8–4.4)
GLUCOSE BLD-MCNC: 125 MG/DL (ref 70–99)
GLUCOSE UR STRIP.AUTO-MCNC: NEGATIVE MG/DL
HCT VFR BLD AUTO: 42.1 %
HGB BLD-MCNC: 13.6 G/DL
IMM GRANULOCYTES # BLD AUTO: 0.04 X10(3) UL (ref 0–1)
IMM GRANULOCYTES NFR BLD: 0.3 %
KETONES UR STRIP.AUTO-MCNC: NEGATIVE MG/DL
LIPASE SERPL-CCNC: 56 U/L (ref 73–393)
LYMPHOCYTES # BLD AUTO: 1.22 X10(3) UL (ref 1–4)
LYMPHOCYTES NFR BLD AUTO: 9.9 %
M PROTEIN MFR SERPL ELPH: 8.1 G/DL (ref 6.4–8.2)
MCH RBC QN AUTO: 26.5 PG (ref 26–34)
MCHC RBC AUTO-ENTMCNC: 32.3 G/DL (ref 31–37)
MCV RBC AUTO: 81.9 FL
MONOCYTES # BLD AUTO: 0.22 X10(3) UL (ref 0.1–1)
MONOCYTES NFR BLD AUTO: 1.8 %
NEUTROPHILS # BLD AUTO: 10.87 X10 (3) UL (ref 1.5–7.7)
NEUTROPHILS # BLD AUTO: 10.87 X10(3) UL (ref 1.5–7.7)
NEUTROPHILS NFR BLD AUTO: 87.8 %
NITRITE UR QL STRIP.AUTO: NEGATIVE
OSMOLALITY SERPL CALC.SUM OF ELEC: 291 MOSM/KG (ref 275–295)
PH UR STRIP.AUTO: 6 [PH] (ref 5–8)
PLATELET # BLD AUTO: 429 10(3)UL (ref 150–450)
POCT URINE PREGNANCY: NEGATIVE
POTASSIUM SERPL-SCNC: 3.7 MMOL/L (ref 3.5–5.1)
PROT UR STRIP.AUTO-MCNC: 30 MG/DL
RBC # BLD AUTO: 5.14 X10(6)UL
RBC #/AREA URNS AUTO: >10 /HPF
SODIUM SERPL-SCNC: 140 MMOL/L (ref 136–145)
SP GR UR STRIP.AUTO: 1.02 (ref 1–1.03)
TROPONIN I SERPL-MCNC: <0.045 NG/ML (ref ?–0.04)
UROBILINOGEN UR STRIP.AUTO-MCNC: <2 MG/DL
WBC # BLD AUTO: 12.4 X10(3) UL (ref 4–11)

## 2021-03-05 PROCEDURE — 87086 URINE CULTURE/COLONY COUNT: CPT | Performed by: EMERGENCY MEDICINE

## 2021-03-05 PROCEDURE — 99285 EMERGENCY DEPT VISIT HI MDM: CPT

## 2021-03-05 PROCEDURE — 76700 US EXAM ABDOM COMPLETE: CPT | Performed by: EMERGENCY MEDICINE

## 2021-03-05 PROCEDURE — 96360 HYDRATION IV INFUSION INIT: CPT

## 2021-03-05 PROCEDURE — 85025 COMPLETE CBC W/AUTO DIFF WBC: CPT | Performed by: EMERGENCY MEDICINE

## 2021-03-05 PROCEDURE — 85379 FIBRIN DEGRADATION QUANT: CPT | Performed by: EMERGENCY MEDICINE

## 2021-03-05 PROCEDURE — 80053 COMPREHEN METABOLIC PANEL: CPT | Performed by: EMERGENCY MEDICINE

## 2021-03-05 PROCEDURE — 83690 ASSAY OF LIPASE: CPT | Performed by: EMERGENCY MEDICINE

## 2021-03-05 PROCEDURE — 71045 X-RAY EXAM CHEST 1 VIEW: CPT | Performed by: EMERGENCY MEDICINE

## 2021-03-05 PROCEDURE — 93005 ELECTROCARDIOGRAM TRACING: CPT

## 2021-03-05 PROCEDURE — 93010 ELECTROCARDIOGRAM REPORT: CPT

## 2021-03-05 PROCEDURE — 81001 URINALYSIS AUTO W/SCOPE: CPT | Performed by: EMERGENCY MEDICINE

## 2021-03-05 PROCEDURE — 81025 URINE PREGNANCY TEST: CPT

## 2021-03-05 PROCEDURE — 96361 HYDRATE IV INFUSION ADD-ON: CPT

## 2021-03-05 PROCEDURE — 76705 ECHO EXAM OF ABDOMEN: CPT | Performed by: EMERGENCY MEDICINE

## 2021-03-05 PROCEDURE — 84484 ASSAY OF TROPONIN QUANT: CPT | Performed by: EMERGENCY MEDICINE

## 2021-03-05 RX ORDER — CYCLOBENZAPRINE HCL 10 MG
10 TABLET ORAL 3 TIMES DAILY PRN
Qty: 20 TABLET | Refills: 0 | Status: SHIPPED | OUTPATIENT
Start: 2021-03-05 | End: 2021-03-12

## 2021-03-06 LAB
ATRIAL RATE: 91 BPM
P AXIS: 9 DEGREES
P-R INTERVAL: 150 MS
Q-T INTERVAL: 364 MS
QRS DURATION: 86 MS
QTC CALCULATION (BEZET): 447 MS
R AXIS: 17 DEGREES
SARS-COV-2 RNA RESP QL NAA+PROBE: NOT DETECTED
T AXIS: 60 DEGREES
VENTRICULAR RATE: 91 BPM

## 2021-03-06 NOTE — ED INITIAL ASSESSMENT (HPI)
Pt presented to ED c/o upper back pain and neck pain. PCP prescribed prednisone and pt states back pain continues moves up to ribs and feels sore with deep breath.

## 2021-03-08 ENCOUNTER — TELEPHONE (OUTPATIENT)
Dept: PHYSICAL THERAPY | Facility: HOSPITAL | Age: 41
End: 2021-03-08

## 2021-03-08 ENCOUNTER — TELEPHONE (OUTPATIENT)
Dept: FAMILY MEDICINE CLINIC | Facility: CLINIC | Age: 41
End: 2021-03-08

## 2021-03-08 NOTE — TELEPHONE ENCOUNTER
Pt called with an update. States she was advised to go to ER due to upper back pain on 3/5/2021 and to call us with an update today. Per pt her upper back pain is much better. She is now with a dull/achey discomfort on her lower abdomen.   States she

## 2021-03-08 NOTE — TELEPHONE ENCOUNTER
If abdominal pain feels similar to her menstrual period, I would not do any further work-up at this time. If not, please have her monitor her pain and if it starts to worsen or localize, please have her call me and we can do imaging.

## 2021-03-08 NOTE — TELEPHONE ENCOUNTER
S/W pt and she states symptoms doesn't feel that it's related to her period. Pt voiced understanding and will monitor for now. Per pt she does have a physical on 3/12/2021, but will call back prior if pain is worsen.

## 2021-03-09 ENCOUNTER — OFFICE VISIT (OUTPATIENT)
Dept: PHYSICAL THERAPY | Facility: HOSPITAL | Age: 41
End: 2021-03-09
Attending: FAMILY MEDICINE
Payer: COMMERCIAL

## 2021-03-09 DIAGNOSIS — S46.819A STRAIN OF TRAPEZIUS MUSCLE, UNSPECIFIED LATERALITY, INITIAL ENCOUNTER: ICD-10-CM

## 2021-03-09 DIAGNOSIS — S39.012A STRAIN OF LUMBAR REGION, INITIAL ENCOUNTER: ICD-10-CM

## 2021-03-09 PROCEDURE — 97140 MANUAL THERAPY 1/> REGIONS: CPT

## 2021-03-09 PROCEDURE — 97162 PT EVAL MOD COMPLEX 30 MIN: CPT

## 2021-03-12 ENCOUNTER — LAB ENCOUNTER (OUTPATIENT)
Dept: LAB | Age: 41
End: 2021-03-12
Attending: FAMILY MEDICINE
Payer: COMMERCIAL

## 2021-03-12 ENCOUNTER — OFFICE VISIT (OUTPATIENT)
Dept: FAMILY MEDICINE CLINIC | Facility: CLINIC | Age: 41
End: 2021-03-12
Payer: COMMERCIAL

## 2021-03-12 VITALS
DIASTOLIC BLOOD PRESSURE: 78 MMHG | HEIGHT: 70 IN | WEIGHT: 293 LBS | RESPIRATION RATE: 18 BRPM | SYSTOLIC BLOOD PRESSURE: 138 MMHG | BODY MASS INDEX: 41.95 KG/M2 | HEART RATE: 72 BPM

## 2021-03-12 DIAGNOSIS — L20.9 ATOPIC DERMATITIS, UNSPECIFIED TYPE: ICD-10-CM

## 2021-03-12 DIAGNOSIS — E55.9 VITAMIN D DEFICIENCY: ICD-10-CM

## 2021-03-12 DIAGNOSIS — R53.83 FATIGUE, UNSPECIFIED TYPE: ICD-10-CM

## 2021-03-12 DIAGNOSIS — Z00.00 ROUTINE GENERAL MEDICAL EXAMINATION AT A HEALTH CARE FACILITY: Primary | ICD-10-CM

## 2021-03-12 DIAGNOSIS — Z12.31 ENCOUNTER FOR SCREENING MAMMOGRAM FOR MALIGNANT NEOPLASM OF BREAST: ICD-10-CM

## 2021-03-12 DIAGNOSIS — I83.93 ASYMPTOMATIC VARICOSE VEINS OF BOTH LOWER EXTREMITIES: ICD-10-CM

## 2021-03-12 DIAGNOSIS — Z00.00 ROUTINE GENERAL MEDICAL EXAMINATION AT A HEALTH CARE FACILITY: ICD-10-CM

## 2021-03-12 LAB
ALBUMIN SERPL-MCNC: 3.6 G/DL (ref 3.4–5)
ALBUMIN/GLOB SERPL: 0.9 {RATIO} (ref 1–2)
ALP LIVER SERPL-CCNC: 61 U/L
ALT SERPL-CCNC: 22 U/L
ANION GAP SERPL CALC-SCNC: 6 MMOL/L (ref 0–18)
AST SERPL-CCNC: 10 U/L (ref 15–37)
BASOPHILS # BLD AUTO: 0.05 X10(3) UL (ref 0–0.2)
BASOPHILS NFR BLD AUTO: 0.6 %
BILIRUB SERPL-MCNC: 0.2 MG/DL (ref 0.1–2)
BILIRUB UR QL STRIP.AUTO: NEGATIVE
BUN BLD-MCNC: 16 MG/DL (ref 7–18)
BUN/CREAT SERPL: 23.9 (ref 10–20)
CALCIUM BLD-MCNC: 9 MG/DL (ref 8.5–10.1)
CHLORIDE SERPL-SCNC: 109 MMOL/L (ref 98–112)
CHOLEST SMN-MCNC: 161 MG/DL (ref ?–200)
CO2 SERPL-SCNC: 24 MMOL/L (ref 21–32)
COLOR UR AUTO: YELLOW
CREAT BLD-MCNC: 0.67 MG/DL
DEPRECATED RDW RBC AUTO: 43 FL (ref 35.1–46.3)
EOSINOPHIL # BLD AUTO: 0.46 X10(3) UL (ref 0–0.7)
EOSINOPHIL NFR BLD AUTO: 5.6 %
ERYTHROCYTE [DISTWIDTH] IN BLOOD BY AUTOMATED COUNT: 14.1 % (ref 11–15)
GLOBULIN PLAS-MCNC: 3.8 G/DL (ref 2.8–4.4)
GLUCOSE BLD-MCNC: 97 MG/DL (ref 70–99)
GLUCOSE UR STRIP.AUTO-MCNC: NEGATIVE MG/DL
HCT VFR BLD AUTO: 43.2 %
HDLC SERPL-MCNC: 52 MG/DL (ref 40–59)
HGB BLD-MCNC: 13.4 G/DL
IMM GRANULOCYTES # BLD AUTO: 0.02 X10(3) UL (ref 0–1)
IMM GRANULOCYTES NFR BLD: 0.2 %
KETONES UR STRIP.AUTO-MCNC: NEGATIVE MG/DL
LDLC SERPL CALC-MCNC: 97 MG/DL (ref ?–100)
LEUKOCYTE ESTERASE UR QL STRIP.AUTO: NEGATIVE
LYMPHOCYTES # BLD AUTO: 2.68 X10(3) UL (ref 1–4)
LYMPHOCYTES NFR BLD AUTO: 32.6 %
M PROTEIN MFR SERPL ELPH: 7.4 G/DL (ref 6.4–8.2)
MCH RBC QN AUTO: 26.4 PG (ref 26–34)
MCHC RBC AUTO-ENTMCNC: 31 G/DL (ref 31–37)
MCV RBC AUTO: 85 FL
MONOCYTES # BLD AUTO: 0.44 X10(3) UL (ref 0.1–1)
MONOCYTES NFR BLD AUTO: 5.3 %
NEUTROPHILS # BLD AUTO: 4.58 X10 (3) UL (ref 1.5–7.7)
NEUTROPHILS # BLD AUTO: 4.58 X10(3) UL (ref 1.5–7.7)
NEUTROPHILS NFR BLD AUTO: 55.7 %
NITRITE UR QL STRIP.AUTO: NEGATIVE
NONHDLC SERPL-MCNC: 109 MG/DL (ref ?–130)
OSMOLALITY SERPL CALC.SUM OF ELEC: 289 MOSM/KG (ref 275–295)
PATIENT FASTING Y/N/NP: YES
PATIENT FASTING Y/N/NP: YES
PH UR STRIP.AUTO: 5 [PH] (ref 5–8)
PLATELET # BLD AUTO: 358 10(3)UL (ref 150–450)
POTASSIUM SERPL-SCNC: 4.1 MMOL/L (ref 3.5–5.1)
PROT UR STRIP.AUTO-MCNC: 30 MG/DL
RBC # BLD AUTO: 5.08 X10(6)UL
RBC UR QL AUTO: NEGATIVE
SODIUM SERPL-SCNC: 139 MMOL/L (ref 136–145)
SP GR UR STRIP.AUTO: 1.03 (ref 1–1.03)
THYROGLOB SERPL-MCNC: 21 U/ML (ref ?–60)
THYROPEROXIDASE AB SERPL-ACNC: 84 U/ML (ref ?–60)
TRIGL SERPL-MCNC: 58 MG/DL (ref 30–149)
TSI SER-ACNC: 1.51 MIU/ML (ref 0.36–3.74)
UROBILINOGEN UR STRIP.AUTO-MCNC: <2 MG/DL
VIT D+METAB SERPL-MCNC: 18.7 NG/ML (ref 30–100)
VLDLC SERPL CALC-MCNC: 12 MG/DL (ref 0–30)
WBC # BLD AUTO: 8.2 X10(3) UL (ref 4–11)

## 2021-03-12 PROCEDURE — 3008F BODY MASS INDEX DOCD: CPT | Performed by: FAMILY MEDICINE

## 2021-03-12 PROCEDURE — 3078F DIAST BP <80 MM HG: CPT | Performed by: FAMILY MEDICINE

## 2021-03-12 PROCEDURE — 82306 VITAMIN D 25 HYDROXY: CPT

## 2021-03-12 PROCEDURE — 3075F SYST BP GE 130 - 139MM HG: CPT | Performed by: FAMILY MEDICINE

## 2021-03-12 PROCEDURE — 86800 THYROGLOBULIN ANTIBODY: CPT

## 2021-03-12 PROCEDURE — 86376 MICROSOMAL ANTIBODY EACH: CPT

## 2021-03-12 PROCEDURE — 81001 URINALYSIS AUTO W/SCOPE: CPT

## 2021-03-12 PROCEDURE — 84443 ASSAY THYROID STIM HORMONE: CPT

## 2021-03-12 PROCEDURE — 80061 LIPID PANEL: CPT

## 2021-03-12 PROCEDURE — 99395 PREV VISIT EST AGE 18-39: CPT | Performed by: FAMILY MEDICINE

## 2021-03-12 PROCEDURE — 80053 COMPREHEN METABOLIC PANEL: CPT

## 2021-03-12 PROCEDURE — 85025 COMPLETE CBC W/AUTO DIFF WBC: CPT

## 2021-03-12 PROCEDURE — 36415 COLL VENOUS BLD VENIPUNCTURE: CPT

## 2021-03-12 NOTE — PROGRESS NOTES
HPI:   Costa Gilmore is a 45year old female who presents for a complete physical exam. Symptoms: denies discharge, itching, burning or dysuria, periods are regular. Patient complains of nothing today.   Denies any recent illnesses or hospitaliza (A) Clear    Spec Gravity 1.018 1.001 - 1.030    Glucose Urine Negative Negative mg/dL    Bilirubin Urine Negative Negative    Ketones Urine Negative Negative mg/dL    Blood Urine Large (A) Negative    pH Urine 6.0 5.0 - 8.0    Protein Urine 30  (A) Negati x10(3) uL    Basophil Absolute 0.02 0.00 - 0.20 x10(3) uL    Immature Granulocyte Absolute 0.04 0.00 - 1.00 x10(3) uL    Neutrophil % 87.8 %    Lymphocyte % 9.9 %    Monocyte % 1.8 %    Eosinophil % 0.0 %    Basophil % 0.2 %    Immature Granulocyte % 0.3 % itching,periods regular   MUSCULOSKELETAL: denies back pain  NEURO: denies headaches, denies LH/dizziness/syncope  PSYCHE: denies depression or anxiety  HEMATOLOGIC: denies hx of anemia  ENDOCRINE: denies thyroid history  ALL/ASTHMA: denies hx of allergy o weekly. The patient indicates understanding of these issues and agrees to the plan.     Routine general medical examination at a health care facility  (primary encounter diagnosis)  Atopic dermatitis, unspecified type - continue lotrisone cream PRN, refer

## 2021-03-15 RX ORDER — ERGOCALCIFEROL 1.25 MG/1
50000 CAPSULE ORAL WEEKLY
Qty: 24 CAPSULE | Refills: 0 | Status: SHIPPED | OUTPATIENT
Start: 2021-03-15 | End: 2021-04-14

## 2021-03-16 ENCOUNTER — OFFICE VISIT (OUTPATIENT)
Dept: PHYSICAL THERAPY | Facility: HOSPITAL | Age: 41
End: 2021-03-16
Attending: FAMILY MEDICINE
Payer: COMMERCIAL

## 2021-03-16 DIAGNOSIS — S39.012A STRAIN OF LUMBAR REGION, INITIAL ENCOUNTER: ICD-10-CM

## 2021-03-16 DIAGNOSIS — S46.819A STRAIN OF TRAPEZIUS MUSCLE, UNSPECIFIED LATERALITY, INITIAL ENCOUNTER: ICD-10-CM

## 2021-03-16 PROCEDURE — 97110 THERAPEUTIC EXERCISES: CPT

## 2021-03-16 PROCEDURE — 97140 MANUAL THERAPY 1/> REGIONS: CPT

## 2021-03-16 NOTE — PROGRESS NOTES
Dx: Strain of lumbar region, initial encounter (S39.012A)  Strain of trapezius muscle, unspecified laterality, initial encounter (D11.776W)         Insurance (Authorized # of Visits):  8 (POC)           Authorizing Physician: Dr. Jame Concepcion  Next MD visit: none X       HEP:   Access Code:  [email or web address]  Exercises  Seated Thoracic Lumbar Extension - 5 reps - 1 sets - 1x daily - 7x weekly  Thoracic Extension on Towel Roll - 5 reps - 1 sets - 1x daily - 7x weekly  Sidelying Thoracic Lumbar Rotation - 10 reps - 1 sets -

## 2021-03-17 ENCOUNTER — HOSPITAL ENCOUNTER (EMERGENCY)
Facility: HOSPITAL | Age: 41
Discharge: HOME | End: 2021-03-18
Attending: EMERGENCY MEDICINE
Payer: COMMERCIAL

## 2021-03-17 ENCOUNTER — HOSPITAL ENCOUNTER (OUTPATIENT)
Age: 41
Discharge: ANOTHER HOSPITAL | End: 2021-03-17
Payer: COMMERCIAL

## 2021-03-17 ENCOUNTER — APPOINTMENT (OUTPATIENT)
Dept: GENERAL RADIOLOGY | Facility: HOSPITAL | Age: 41
End: 2021-03-17
Attending: EMERGENCY MEDICINE
Payer: COMMERCIAL

## 2021-03-17 ENCOUNTER — APPOINTMENT (OUTPATIENT)
Dept: GENERAL RADIOLOGY | Facility: HOSPITAL | Age: 41
End: 2021-03-17
Payer: COMMERCIAL

## 2021-03-17 VITALS
RESPIRATION RATE: 18 BRPM | SYSTOLIC BLOOD PRESSURE: 146 MMHG | HEART RATE: 125 BPM | OXYGEN SATURATION: 98 % | DIASTOLIC BLOOD PRESSURE: 115 MMHG | TEMPERATURE: 99 F

## 2021-03-17 DIAGNOSIS — R00.2 PALPITATIONS: Primary | ICD-10-CM

## 2021-03-17 LAB
ALBUMIN SERPL-MCNC: 3.7 G/DL (ref 3.4–5)
ALBUMIN/GLOB SERPL: 0.9 {RATIO} (ref 1–2)
ALP LIVER SERPL-CCNC: 66 U/L
ALT SERPL-CCNC: 31 U/L
ANION GAP SERPL CALC-SCNC: 5 MMOL/L (ref 0–18)
AST SERPL-CCNC: 13 U/L (ref 15–37)
BASOPHILS # BLD AUTO: 0.08 X10(3) UL (ref 0–0.2)
BASOPHILS NFR BLD AUTO: 0.5 %
BILIRUB SERPL-MCNC: 0.4 MG/DL (ref 0.1–2)
BUN BLD-MCNC: 8 MG/DL (ref 7–18)
BUN/CREAT SERPL: 10.7 (ref 10–20)
CALCIUM BLD-MCNC: 9.2 MG/DL (ref 8.5–10.1)
CHLORIDE SERPL-SCNC: 107 MMOL/L (ref 98–112)
CO2 SERPL-SCNC: 24 MMOL/L (ref 21–32)
CREAT BLD-MCNC: 0.75 MG/DL
DEPRECATED RDW RBC AUTO: 41.4 FL (ref 35.1–46.3)
EOSINOPHIL # BLD AUTO: 0.18 X10(3) UL (ref 0–0.7)
EOSINOPHIL NFR BLD AUTO: 1.2 %
ERYTHROCYTE [DISTWIDTH] IN BLOOD BY AUTOMATED COUNT: 14.1 % (ref 11–15)
GLOBULIN PLAS-MCNC: 4.1 G/DL (ref 2.8–4.4)
GLUCOSE BLD-MCNC: 95 MG/DL (ref 70–99)
HCT VFR BLD AUTO: 42.8 %
HGB BLD-MCNC: 13.8 G/DL
IMM GRANULOCYTES # BLD AUTO: 0.05 X10(3) UL (ref 0–1)
IMM GRANULOCYTES NFR BLD: 0.3 %
LYMPHOCYTES # BLD AUTO: 3.83 X10(3) UL (ref 1–4)
LYMPHOCYTES NFR BLD AUTO: 24.6 %
M PROTEIN MFR SERPL ELPH: 7.8 G/DL (ref 6.4–8.2)
MCH RBC QN AUTO: 26.3 PG (ref 26–34)
MCHC RBC AUTO-ENTMCNC: 32.2 G/DL (ref 31–37)
MCV RBC AUTO: 81.7 FL
MONOCYTES # BLD AUTO: 0.71 X10(3) UL (ref 0.1–1)
MONOCYTES NFR BLD AUTO: 4.6 %
NEUTROPHILS # BLD AUTO: 10.73 X10 (3) UL (ref 1.5–7.7)
NEUTROPHILS # BLD AUTO: 10.73 X10(3) UL (ref 1.5–7.7)
NEUTROPHILS NFR BLD AUTO: 68.8 %
NT-PROBNP SERPL-MCNC: 16 PG/ML (ref ?–125)
OSMOLALITY SERPL CALC.SUM OF ELEC: 280 MOSM/KG (ref 275–295)
PLATELET # BLD AUTO: 354 10(3)UL (ref 150–450)
POCT LOT NUMBER: NORMAL
POCT URINE PREGNANCY: NEGATIVE
POTASSIUM SERPL-SCNC: 3.9 MMOL/L (ref 3.5–5.1)
RBC # BLD AUTO: 5.24 X10(6)UL
SODIUM SERPL-SCNC: 136 MMOL/L (ref 136–145)
TROPONIN I SERPL-MCNC: <0.045 NG/ML (ref ?–0.04)
TROPONIN I SERPL-MCNC: <0.045 NG/ML (ref ?–0.04)
TSI SER-ACNC: 2.96 MIU/ML (ref 0.36–3.74)
WBC # BLD AUTO: 15.6 X10(3) UL (ref 4–11)

## 2021-03-17 PROCEDURE — 81025 URINE PREGNANCY TEST: CPT

## 2021-03-17 PROCEDURE — 93005 ELECTROCARDIOGRAM TRACING: CPT

## 2021-03-17 PROCEDURE — 99215 OFFICE O/P EST HI 40 MIN: CPT | Performed by: NURSE PRACTITIONER

## 2021-03-17 PROCEDURE — 84484 ASSAY OF TROPONIN QUANT: CPT | Performed by: EMERGENCY MEDICINE

## 2021-03-17 PROCEDURE — 99285 EMERGENCY DEPT VISIT HI MDM: CPT

## 2021-03-17 PROCEDURE — 84443 ASSAY THYROID STIM HORMONE: CPT | Performed by: EMERGENCY MEDICINE

## 2021-03-17 PROCEDURE — 93010 ELECTROCARDIOGRAM REPORT: CPT

## 2021-03-17 PROCEDURE — 93000 ELECTROCARDIOGRAM COMPLETE: CPT | Performed by: NURSE PRACTITIONER

## 2021-03-17 PROCEDURE — 71045 X-RAY EXAM CHEST 1 VIEW: CPT | Performed by: EMERGENCY MEDICINE

## 2021-03-17 PROCEDURE — 96360 HYDRATION IV INFUSION INIT: CPT

## 2021-03-17 PROCEDURE — 83880 ASSAY OF NATRIURETIC PEPTIDE: CPT | Performed by: EMERGENCY MEDICINE

## 2021-03-17 PROCEDURE — 96361 HYDRATE IV INFUSION ADD-ON: CPT

## 2021-03-17 PROCEDURE — 85025 COMPLETE CBC W/AUTO DIFF WBC: CPT | Performed by: EMERGENCY MEDICINE

## 2021-03-17 PROCEDURE — 80053 COMPREHEN METABOLIC PANEL: CPT | Performed by: EMERGENCY MEDICINE

## 2021-03-17 RX ORDER — SODIUM CHLORIDE 9 MG/ML
INJECTION, SOLUTION INTRAVENOUS ONCE
Status: COMPLETED | OUTPATIENT
Start: 2021-03-17 | End: 2021-03-18

## 2021-03-17 RX ORDER — CLINDAMYCIN HYDROCHLORIDE 150 MG/1
CAPSULE ORAL 3 TIMES DAILY
COMMUNITY
End: 2021-04-05 | Stop reason: ALTCHOICE

## 2021-03-17 NOTE — ED INITIAL ASSESSMENT (HPI)
Reports having elevated heart rate at home. Denies shortness of breath, dizziness, or chest pain. Reports she had similar symptoms 12 days ago and was checked out at [facility]. Not sure if she is dehydrated.   She reports she is eating and drinking, but

## 2021-03-17 NOTE — ED PROVIDER NOTES
Patient Seen in: Immediate [address] [place] [address]      History   Patient presents with:  Arrythmia/Palpitations    Stated Complaint: palpitations    HPI/Subjective:   HPI    71-year-old female presents for an elevated heart rate, denies any chest pain, di ED Triage Vitals [03/17/21 1803]   BP (!) 152/96   Pulse 113   Resp 18   Temp 98.5 °F (36.9 °C)   Temp src Oral   SpO2 98 %   O2 Device None (Room air)       Current:BP (!) 173/109   Pulse 99   Temp 98.5 °F (36.9 °C) (Oral)   Resp 18   LMP 03/04/2021 ( picture of rhythm strip above. She does have some apparent EKG changes as well.   Unfortunately, we do not have the ability to monitor her for any extended period of time, and feel she needs a full evaluation as well as IV fluids and possible cardiac consu

## 2021-03-18 ENCOUNTER — OFFICE VISIT (OUTPATIENT)
Dept: PHYSICAL THERAPY | Facility: HOSPITAL | Age: 41
End: 2021-03-18
Attending: FAMILY MEDICINE
Payer: COMMERCIAL

## 2021-03-18 VITALS
BODY MASS INDEX: 41.95 KG/M2 | HEIGHT: 70 IN | OXYGEN SATURATION: 99 % | RESPIRATION RATE: 12 BRPM | DIASTOLIC BLOOD PRESSURE: 88 MMHG | HEART RATE: 89 BPM | WEIGHT: 293 LBS | SYSTOLIC BLOOD PRESSURE: 143 MMHG

## 2021-03-18 LAB
ATRIAL RATE: 101 BPM
P AXIS: 34 DEGREES
P-R INTERVAL: 160 MS
Q-T INTERVAL: 324 MS
QRS DURATION: 74 MS
QTC CALCULATION (BEZET): 420 MS
R AXIS: 0 DEGREES
T AXIS: -11 DEGREES
VENTRICULAR RATE: 101 BPM

## 2021-03-18 PROCEDURE — 97140 MANUAL THERAPY 1/> REGIONS: CPT

## 2021-03-18 PROCEDURE — 97110 THERAPEUTIC EXERCISES: CPT

## 2021-03-18 NOTE — PROGRESS NOTES
Dx: Strain of lumbar region, initial encounter (S39.012A)  Strain of trapezius muscle, unspecified laterality, initial encounter (R82.715A)         Insurance (Authorized # of Visits):  8 (POC)           Authorizing Physician: Dr. Jessica Núñez  Next MD visit: none tennis ball  TRX thoracic extension stretch 10x, 5 sec hold   Rows green TB 3x12  Median nerve floss L UE 10x      Man there  P-A mobs thoracic and lower cervical grade II-III multiple bouts t/o, with skin lock for thoracic   Manual distraction multiple lillian

## 2021-03-18 NOTE — ED PROVIDER NOTES
Patient Seen in: [facility] Emergency Department      History   Patient presents with:  Arrythmia/Palpitations    Stated Complaint: palipitations from [address] [place] [address], denies cp, EKG ST    HPI/Subjective:   HPI    45 yr old F here sent from [address] [place] [address] for palpitations. normal.      Comments: Pupils equal, anicteric   Cardiovascular:      Rate and Rhythm: Regular rhythm. Tachycardia present. Pulses: Normal pulses. Heart sounds: Normal heart sounds.       Comments: HR 80s-105  Pulmonary:      Effort: Pulmonary eff DRAW LIGHT GREEN   RAINBOW DRAW GOLD     EKG    Rate, intervals and axes as noted on EKG Report.   Rate: 117  Rhythm: sinus tach  Reading: TWI inf leads, V3-V6, not seen on last ekg, but seen on 2018 ekg               XR CHEST AP PORTABLE  (CPT=71045)    Re

## 2021-03-18 NOTE — ED INITIAL ASSESSMENT (HPI)
Today felt heart rate was elevated and irregular.  \"I could tell my heart was beating faster than normal.\" On antibiotics for tooth infection and status post root canal. Sent from urgent care for evaluation

## 2021-03-20 LAB
ATRIAL RATE: 117 BPM
P AXIS: 36 DEGREES
P-R INTERVAL: 154 MS
Q-T INTERVAL: 318 MS
QRS DURATION: 76 MS
QTC CALCULATION (BEZET): 443 MS
R AXIS: 66 DEGREES
T AXIS: -8 DEGREES
VENTRICULAR RATE: 117 BPM

## 2021-04-05 ENCOUNTER — LAB ENCOUNTER (OUTPATIENT)
Dept: LAB | Age: 41
End: 2021-04-05
Attending: FAMILY MEDICINE
Payer: COMMERCIAL

## 2021-04-05 ENCOUNTER — OFFICE VISIT (OUTPATIENT)
Dept: FAMILY MEDICINE CLINIC | Facility: CLINIC | Age: 41
End: 2021-04-05
Payer: COMMERCIAL

## 2021-04-05 ENCOUNTER — OFFICE VISIT (OUTPATIENT)
Dept: PHYSICAL THERAPY | Facility: HOSPITAL | Age: 41
End: 2021-04-05
Attending: FAMILY MEDICINE
Payer: COMMERCIAL

## 2021-04-05 ENCOUNTER — TELEPHONE (OUTPATIENT)
Dept: FAMILY MEDICINE CLINIC | Facility: CLINIC | Age: 41
End: 2021-04-05

## 2021-04-05 VITALS
OXYGEN SATURATION: 97 % | HEART RATE: 80 BPM | RESPIRATION RATE: 16 BRPM | WEIGHT: 293 LBS | HEIGHT: 70 IN | SYSTOLIC BLOOD PRESSURE: 140 MMHG | DIASTOLIC BLOOD PRESSURE: 90 MMHG | BODY MASS INDEX: 41.95 KG/M2

## 2021-04-05 DIAGNOSIS — R07.81 RIB PAIN ON RIGHT SIDE: ICD-10-CM

## 2021-04-05 DIAGNOSIS — D72.829 LEUKOCYTOSIS, UNSPECIFIED TYPE: Primary | ICD-10-CM

## 2021-04-05 DIAGNOSIS — D72.829 LEUKOCYTOSIS, UNSPECIFIED TYPE: ICD-10-CM

## 2021-04-05 DIAGNOSIS — R03.0 ELEVATED BLOOD PRESSURE READING: ICD-10-CM

## 2021-04-05 PROCEDURE — 85025 COMPLETE CBC W/AUTO DIFF WBC: CPT

## 2021-04-05 PROCEDURE — 3077F SYST BP >= 140 MM HG: CPT | Performed by: FAMILY MEDICINE

## 2021-04-05 PROCEDURE — 3008F BODY MASS INDEX DOCD: CPT | Performed by: FAMILY MEDICINE

## 2021-04-05 PROCEDURE — 36415 COLL VENOUS BLD VENIPUNCTURE: CPT

## 2021-04-05 PROCEDURE — 97140 MANUAL THERAPY 1/> REGIONS: CPT

## 2021-04-05 PROCEDURE — 97110 THERAPEUTIC EXERCISES: CPT

## 2021-04-05 PROCEDURE — 3080F DIAST BP >= 90 MM HG: CPT | Performed by: FAMILY MEDICINE

## 2021-04-05 PROCEDURE — 99214 OFFICE O/P EST MOD 30 MIN: CPT | Performed by: FAMILY MEDICINE

## 2021-04-05 RX ORDER — ETODOLAC 400 MG/1
400 TABLET, FILM COATED ORAL 2 TIMES DAILY
Qty: 28 TABLET | Refills: 0 | Status: SHIPPED | OUTPATIENT
Start: 2021-04-05 | End: 2021-04-19

## 2021-04-05 NOTE — PROGRESS NOTES
Dx: Strain of lumbar region, initial encounter (S39.012A)  Strain of trapezius muscle, unspecified laterality, initial encounter (L31.313R)         Insurance (Authorized # of Visits):  8 (POC)           Authorizing Physician: Dr. John Coronado  Next MD visit: none thoracic extension stretch 10x, 5 sec hold   Rows green TB 3x12  Median nerve floss L UE 10x There ex  Thoracic extension over towel roll 3x  Tennis ball massage on wall in thoracic paraspinals, QL  Standing hip abduction red TB 2x10 ea     Man there  P-A

## 2021-04-05 NOTE — PROGRESS NOTES
[address] Office Note  Chief Complaint:   No chief complaint on file. HPI:   This is a 45year old female coming in for elevated white blood cell count, pain in the right ribs and elevated blood pressure.   Patient recently h 24 capsule 0   • Saline Nasal Spray 0.65 % Nasal Solution 1 spray by Nasal route as needed for congestion. • Cetirizine HCl (ZYRTEC ALLERGY OR) Take by mouth as needed.        • Fluticasone Propionate (FLONASE ALLERGY RELIEF NA) by Nasal route as needed PLATELET; Future    2. Rib pain on right side  -  New onset  -  Trial of etodolac  -  Ice/heat alternating  -  F/u if no improvement  - Etodolac 400 MG Oral Tab; Take 1 tablet (400 mg total) by mouth 2 (two) times daily for 14 days.   Dispense: 28 tablet; R

## 2021-04-05 NOTE — TELEPHONE ENCOUNTER
LMOVM to CB . Please see note below. Pt made an appt through my chart.  I left A message for her to be triaged further              \

## 2021-04-05 NOTE — TELEPHONE ENCOUNTER
I called and spoke to pt. She has had the symptoms in her Abdomen for a few weeks. She feels it could be a pulled muscle. She would jay like to discuss her labs with you.  Pt has an appt today at 4;00 PM

## 2021-04-05 NOTE — TELEPHONE ENCOUNTER
Pt made mychart appt with below message     Appointment For: Sha Valdivia ([id number])   Visit Type: Connie Torres ([id number])      4/5/2021     4:00 PM  15 mins. Chin Rivers,    EMG 11 RE      Patient Comments:   Recheck of bloodwork

## 2021-04-07 ENCOUNTER — OFFICE VISIT (OUTPATIENT)
Dept: PHYSICAL THERAPY | Facility: HOSPITAL | Age: 41
End: 2021-04-07
Attending: FAMILY MEDICINE
Payer: COMMERCIAL

## 2021-04-07 PROCEDURE — 97140 MANUAL THERAPY 1/> REGIONS: CPT

## 2021-04-07 PROCEDURE — 97110 THERAPEUTIC EXERCISES: CPT

## 2021-04-07 NOTE — PROGRESS NOTES
Dx: Strain of lumbar region, initial encounter (S39.012A)  Strain of trapezius muscle, unspecified laterality, initial encounter (S32.518K)         Insurance (Authorized # of Visits):  8 (POC)           Authorizing Physician: Dr. Kirt Garcia  Next MD visit: none towel roll  FR posture series: 10x ea arm scissors, snow angels, T.  Pec stretch 3x30 sec  pec stretch in doorway 3x30 sec There ex  Self STM to thoracic paraspinals tennis ball  TRX thoracic extension stretch 10x, 5 sec hold   Rows green TB 3x12  Median ne

## 2021-04-12 ENCOUNTER — OFFICE VISIT (OUTPATIENT)
Dept: PHYSICAL THERAPY | Facility: HOSPITAL | Age: 41
End: 2021-04-12
Attending: FAMILY MEDICINE
Payer: COMMERCIAL

## 2021-04-12 PROCEDURE — 97110 THERAPEUTIC EXERCISES: CPT

## 2021-04-12 PROCEDURE — 97140 MANUAL THERAPY 1/> REGIONS: CPT

## 2021-04-12 NOTE — PROGRESS NOTES
Dx: Strain of lumbar region, initial encounter (S39.012A)  Strain of trapezius muscle, unspecified laterality, initial encounter (C33.763W)         Insurance (Authorized # of Visits):  8 (POC)           Authorizing Physician: Dr. Dalia Mosquera  Next MD visit: none ball  TRX thoracic extension stretch 10x, 5 sec hold   Rows green TB 3x12  Median nerve floss L UE 10x There ex  Thoracic extension over towel roll 3x  Tennis ball massage on wall in thoracic paraspinals, QL  Standing hip abduction red TB 2x10 ea There ex

## 2021-04-14 ENCOUNTER — OFFICE VISIT (OUTPATIENT)
Dept: PHYSICAL THERAPY | Facility: HOSPITAL | Age: 41
End: 2021-04-14
Attending: FAMILY MEDICINE
Payer: COMMERCIAL

## 2021-04-14 PROCEDURE — 97140 MANUAL THERAPY 1/> REGIONS: CPT

## 2021-04-14 PROCEDURE — 97110 THERAPEUTIC EXERCISES: CPT

## 2021-04-14 NOTE — PROGRESS NOTES
Dx: Strain of lumbar region, initial encounter (S39.012A)  Strain of trapezius muscle, unspecified laterality, initial encounter (F11.438T)         Insurance (Authorized # of Visits):  8 (POC)           Authorizing Physician: Dr. Jalen Deras  Next MD visit: none Consider supine thoracic manipulation/PN  Date: 3/16/2021  TX#: 2/8 Date: 3/19/2021          TX#: 3/8 Date: 4/5/2021          TX#: 4/8 Date: 4/7/2021             TX#: 5/8 Date:4/12/2021   Tx#: 6/8 Date:4/14/2021   Tx#: 7/8   There ex  Thoracic rotation str paraspinals  P-A thoracic spine grade II-III throughout thoracic spine  Man there x21 min Man there  P-A thoracic spine grade II-III throughout thoracic spine, with skin lock and unilateral P-A.   x11 min total Man there  P-A thoracic spine grade II-III th

## 2021-04-19 ENCOUNTER — OFFICE VISIT (OUTPATIENT)
Dept: PHYSICAL THERAPY | Facility: HOSPITAL | Age: 41
End: 2021-04-19
Attending: FAMILY MEDICINE
Payer: COMMERCIAL

## 2021-04-19 PROCEDURE — 97140 MANUAL THERAPY 1/> REGIONS: CPT

## 2021-04-19 PROCEDURE — 97110 THERAPEUTIC EXERCISES: CPT

## 2021-04-19 NOTE — PROGRESS NOTES
Progress Summary  Pt has attended 8 visits in Physical Therapy.      Dx: Strain of lumbar region, initial encounter (S39.012A)  Strain of trapezius muscle, unspecified laterality, initial encounter (S64.811L)         Insurance (Authorized # of Visits):  8 comprehensive HEP PROGRESSING      Plan: Continue skilled Physical Therapy 1 x/week or a total of 4 visits over a 90 day period.  Treatment will include: manual therapy, therapeutic exercise, neuromuscular re-education, therapeutic activities       Patient/ sets to fatigue  Prone Y 3x no weight, DC due to difficulty isolating lower traps  Y lift off on wall 3 sets to fatigue  High row blue TB 2x12  Shoulder wall walking yellow TB 2x8  Trunk flexion stretching with ball 10x  Lateral trunk flexion stretching wi

## 2021-04-21 ENCOUNTER — OFFICE VISIT (OUTPATIENT)
Dept: PODIATRY CLINIC | Facility: CLINIC | Age: 41
End: 2021-04-21
Payer: COMMERCIAL

## 2021-04-21 DIAGNOSIS — L03.032 PARONYCHIA OF THIRD TOE OF LEFT FOOT: Primary | ICD-10-CM

## 2021-04-21 PROCEDURE — 99202 OFFICE O/P NEW SF 15 MIN: CPT | Performed by: PODIATRIST

## 2021-04-21 RX ORDER — CLINDAMYCIN HYDROCHLORIDE 150 MG/1
150 CAPSULE ORAL 3 TIMES DAILY
Qty: 21 CAPSULE | Refills: 0 | Status: SHIPPED | OUTPATIENT
Start: 2021-04-21

## 2021-04-21 NOTE — PROGRESS NOTES
HPI:    Patient ID: Sha Valdivia is a 45year old female. Pleasant 80-year-old female presents as a new patient to me and states that she is self-referred. She is concerned about the reddened third toe of the left foot.   Less than a week ago ASSESSMENT/PLAN:   Paronychia of third toe of left foot  (primary encounter diagnosis)    No orders of the defined types were placed in this encounter.       Meds This Visit:  Requested Prescriptions     Signed Prescriptions Disp Refills   • clindamycin H

## 2021-04-27 ENCOUNTER — APPOINTMENT (OUTPATIENT)
Dept: PHYSICAL THERAPY | Facility: HOSPITAL | Age: 41
End: 2021-04-27
Attending: FAMILY MEDICINE
Payer: COMMERCIAL

## 2021-05-03 ENCOUNTER — OFFICE VISIT (OUTPATIENT)
Dept: PHYSICAL THERAPY | Facility: HOSPITAL | Age: 41
End: 2021-05-03
Attending: FAMILY MEDICINE
Payer: COMMERCIAL

## 2021-05-03 PROCEDURE — 97110 THERAPEUTIC EXERCISES: CPT

## 2021-05-03 PROCEDURE — 97140 MANUAL THERAPY 1/> REGIONS: CPT

## 2021-05-03 NOTE — PROGRESS NOTES
Dx: Strain of lumbar region, initial encounter (S39.012A)  Strain of trapezius muscle, unspecified laterality, initial encounter (U58.088F)         Insurance (Authorized # of Visits):  8 (POC)           Authorizing Physician: Dr. Jalen Deras  Next MD visit: none retraction with ER yellow TB 3x8 There ex  Thoracic extension over foam roll 4 places  Quadriped thoracic rotation hand behind head 2x5 ea  Prone scapular retraction with horizontal abduction 3x12 1# weight  High row green TB 2x12  Trunk rotation on ballet [id number]  Exercises  Seated Thoracic Lumbar Extension - 1 x daily - 7 x weekly - 5 reps - 1 sets  Thoracic Extension on Towel Roll - 1 x daily - 7 x weekly - 5 reps - 1 sets  Sidelying Thoracic Lumbar Rotation - 1 x daily - 7 x weekly - 10 reps - 1 sets  D

## 2021-05-12 ENCOUNTER — OFFICE VISIT (OUTPATIENT)
Dept: PHYSICAL THERAPY | Facility: HOSPITAL | Age: 41
End: 2021-05-12
Attending: FAMILY MEDICINE
Payer: COMMERCIAL

## 2021-05-12 PROCEDURE — 97110 THERAPEUTIC EXERCISES: CPT

## 2021-05-12 NOTE — PROGRESS NOTES
Dx: Strain of lumbar region, initial encounter (S39.012A)  Strain of trapezius muscle, unspecified laterality, initial encounter (H36.030R)         Insurance (Authorized # of Visits):  12 (POC)           Authorizing Physician: Dr. Mary Kay Waddell  Next MD visit: none extension over foam roll 4 places  Quadriped thoracic rotation hand behind head 2x5 ea  Prone scapular retraction with horizontal abduction 3x12 1# weight  High row green TB 2x12  Trunk rotation on ballet bar 10x ea There ex  Thoracic extension over foam r unilateral P-A. STM to L thoracic paraspinals Man there  Supine thoracic manipulation 1x with cavitation to mid thoracic spine  P-A thoracic spine grade II-III throughout thoracic spine, with skin lock and unilateral P-A.    STM to L thoracic paraspinals

## 2021-05-17 ENCOUNTER — IMMUNIZATION (OUTPATIENT)
Dept: LAB | Facility: HOSPITAL | Age: 41
End: 2021-05-17
Attending: EMERGENCY MEDICINE
Payer: COMMERCIAL

## 2021-05-17 DIAGNOSIS — Z23 NEED FOR VACCINATION: Primary | ICD-10-CM

## 2021-05-17 PROCEDURE — 0001A SARSCOV2 VAC 30MCG/0.3ML IM: CPT

## 2021-05-24 ENCOUNTER — OFFICE VISIT (OUTPATIENT)
Dept: PHYSICAL THERAPY | Facility: HOSPITAL | Age: 41
End: 2021-05-24
Attending: FAMILY MEDICINE
Payer: COMMERCIAL

## 2021-05-24 PROCEDURE — 97110 THERAPEUTIC EXERCISES: CPT

## 2021-05-24 PROCEDURE — 97140 MANUAL THERAPY 1/> REGIONS: CPT

## 2021-05-24 NOTE — PROGRESS NOTES
Dx: Strain of lumbar region, initial encounter (S39.012A)  Strain of trapezius muscle, unspecified laterality, initial encounter (K90.949Z)         Insurance (Authorized # of Visits):  12 (POC)           Authorizing Physician: Dr. Elizabeth Zambrano  Next MD visit: none scapular retraction with horizontal abduction 3x12 1# weight  High row green TB 2x12  Trunk rotation on ballet bar 10x ea There ex  Thoracic extension over foam roll 4 places  Child's pose 2x30 sec in center, 30 sec ea L/R  Lower trunk rotation 1x ea 20 se there  Supine thoracic manipulation 1x with cavitation to mid thoracic spine  P-A thoracic spine grade II-III throughout thoracic spine, with skin lock and unilateral P-A.    STM to L thoracic paraspinals X Man there  Unilateral/central P-A to thoracic spin

## 2021-06-07 ENCOUNTER — OFFICE VISIT (OUTPATIENT)
Dept: PHYSICAL THERAPY | Facility: HOSPITAL | Age: 41
End: 2021-06-07
Attending: FAMILY MEDICINE
Payer: COMMERCIAL

## 2021-06-07 ENCOUNTER — IMMUNIZATION (OUTPATIENT)
Dept: LAB | Facility: HOSPITAL | Age: 41
End: 2021-06-07
Attending: EMERGENCY MEDICINE
Payer: COMMERCIAL

## 2021-06-07 DIAGNOSIS — Z23 NEED FOR VACCINATION: Primary | ICD-10-CM

## 2021-06-07 PROCEDURE — 0002A SARSCOV2 VAC 30MCG/0.3ML IM: CPT

## 2021-06-07 PROCEDURE — 97140 MANUAL THERAPY 1/> REGIONS: CPT

## 2021-06-07 PROCEDURE — 97110 THERAPEUTIC EXERCISES: CPT

## 2021-06-07 NOTE — PROGRESS NOTES
Progress/Discharge Summary  Pt has attended 12 visits in Physical Therapy.      Dx: Strain of lumbar region, initial encounter (S39.012A)  Strain of trapezius muscle, unspecified laterality, initial encounter (R68.208F)         Insurance (Authorized # of V actively participate in planning and for this course of care. Thank you for your referral. If you have any questions, please contact me at Dept: [phone].     Sincerely,  Electronically signed by therapist: Kaitlynn Sommers, PT       Certification From: 6 throughout thoracic spine, with skin lock and unilateral P-A.    STM to L thoracic paraspinals X Man there  Unilateral/central P-A to thoracic spine grade III-IV to improve mobility multiple bouts in mid-low thoracic spine  STM/TPM to thoracic paraspinals M

## 2021-08-23 ENCOUNTER — OFFICE VISIT (OUTPATIENT)
Dept: FAMILY MEDICINE CLINIC | Facility: CLINIC | Age: 41
End: 2021-08-23
Payer: COMMERCIAL

## 2021-08-23 VITALS
WEIGHT: 293 LBS | RESPIRATION RATE: 16 BRPM | HEIGHT: 70 IN | DIASTOLIC BLOOD PRESSURE: 96 MMHG | SYSTOLIC BLOOD PRESSURE: 154 MMHG | HEART RATE: 74 BPM | BODY MASS INDEX: 41.95 KG/M2 | TEMPERATURE: 98 F

## 2021-08-23 DIAGNOSIS — R10.9 LEFT FLANK PAIN: Primary | ICD-10-CM

## 2021-08-23 LAB
APPEARANCE: CLEAR
BILIRUBIN: NEGATIVE
GLUCOSE (URINE DIPSTICK): NEGATIVE MG/DL
KETONES (URINE DIPSTICK): NEGATIVE MG/DL
LEUKOCYTES: NEGATIVE
MULTISTIX EXPIRATION DATE: ABNORMAL DATE
MULTISTIX LOT#: ABNORMAL NUMERIC
NITRITE, URINE: NEGATIVE
PH, URINE: 7 (ref 4.5–8)
PROTEIN (URINE DIPSTICK): NEGATIVE MG/DL
SPECIFIC GRAVITY: 1.02 (ref 1–1.03)
UROBILINOGEN,SEMI-QN: 0.2 MG/DL (ref 0–1.9)

## 2021-08-23 PROCEDURE — 81003 URINALYSIS AUTO W/O SCOPE: CPT | Performed by: FAMILY MEDICINE

## 2021-08-23 PROCEDURE — 99214 OFFICE O/P EST MOD 30 MIN: CPT | Performed by: FAMILY MEDICINE

## 2021-08-23 PROCEDURE — 3008F BODY MASS INDEX DOCD: CPT | Performed by: FAMILY MEDICINE

## 2021-08-23 PROCEDURE — 3077F SYST BP >= 140 MM HG: CPT | Performed by: FAMILY MEDICINE

## 2021-08-23 PROCEDURE — 3080F DIAST BP >= 90 MM HG: CPT | Performed by: FAMILY MEDICINE

## 2021-08-23 RX ORDER — ETODOLAC 400 MG/1
400 TABLET, FILM COATED ORAL 2 TIMES DAILY
Qty: 28 TABLET | Refills: 0 | Status: SHIPPED | OUTPATIENT
Start: 2021-08-23 | End: 2021-09-06

## 2021-08-23 NOTE — PROGRESS NOTES
[facility] Office Note  Chief Complaint:   Patient presents with:  Back Pain: left flank and back pain      HPI:   This is a 45year old female coming in for left flank and back pain. This is been ongoing for a week.   Denies any Denies headache, dizziness, syncope, numbness or tingling in the extremities.   MUSCULOSKELETAL:  + left flank pain    EXAM:   BP (!) 160/96 (BP Location: Right arm, Patient Position: Sitting, Cuff Size: large)   Pulse 74   Temp 97.7 °F (36.5 °C) (Oral)   R List:  Patient Active Problem List:     Morbid obesity with BMI of 45.0-49.9, adult ([facility]      [address] [facility], DO    Please note that portions of this note may have been completed with a voice recognition program. Efforts were made to edit the dictatio

## 2021-12-14 ENCOUNTER — IMMUNIZATION (OUTPATIENT)
Dept: LAB | Facility: HOSPITAL | Age: 41
End: 2021-12-14
Attending: EMERGENCY MEDICINE
Payer: COMMERCIAL

## 2021-12-14 DIAGNOSIS — Z23 NEED FOR VACCINATION: Primary | ICD-10-CM

## 2021-12-14 PROCEDURE — 0004A SARSCOV2 VAC 30MCG/0.3ML IM: CPT

## 2022-02-23 ENCOUNTER — TELEMEDICINE (OUTPATIENT)
Dept: FAMILY MEDICINE CLINIC | Facility: CLINIC | Age: 42
End: 2022-02-23

## 2022-02-23 DIAGNOSIS — J02.9 SORE THROAT: Primary | ICD-10-CM

## 2022-02-23 PROCEDURE — 99214 OFFICE O/P EST MOD 30 MIN: CPT | Performed by: FAMILY MEDICINE

## 2022-02-23 RX ORDER — DOXYCYCLINE HYCLATE 100 MG
100 TABLET ORAL 2 TIMES DAILY
Qty: 20 TABLET | Refills: 0 | Status: SHIPPED | OUTPATIENT
Start: 2022-02-23 | End: 2022-03-05

## 2022-02-23 NOTE — PROGRESS NOTES
Subjective     HPI:   Za Machuca verbally consents to a Virtual/Telephone Check-In service on 02/23/22. Patient understands and accepts financial responsibility for any deductible, co-insurance and/or co-pays associated with this service. This visit is conducted using Telemedicine with live, interactive video and audio. I returned Za Machuca call by secure video chat, verified date of birth, and discussed their current concerns:     Patient complains of a sore throat since this past Saturday. Denies any fever or cough. She is starting to have some sinus congestion and pressure. Denies any exposure to COVID-19. She is fully vaccinated with booster. No Further Nursing Notes to Review  Tobacco Reviewed  Allergies   Reviewed  Medications Reviewed  Problem List Reviewed  Medical History   Reviewed  Surgical History Reviewed  Family History Reviewed            REVIEW OF SYSTEMS:  Pertinent items are noted in HPI. Physical Exam:  alert, appears stated age and cooperative, Normocephalic, without obvious abnormality, atraumatic, Speaking in full sentences comfortably and Normal work of breathing        Assessment    Diagnoses and all orders for this visit:    Sore throat  -     Doxycycline Hyclate 100 MG Oral Tab;  Take 1 tablet (100 mg total) by mouth 2 (two) times daily for 10 days.  -  DDx: covid 19 vs. Strep throat vs. Sinusitis  -  No exposure to covid and fully vaccinated makes it less likely  -  Due to sinus congestion and pressure, will treat with doxycycline for sinusitis  -  Doxycycline should also cover strep if present  -  F/u if symptoms persist         Follow up: PRN  Time of visit: 6 min    STEPHON MORRIS,

## 2022-09-23 NOTE — ED PROVIDER NOTES
Patient Seen in: [facility] Emergency Department      History   Patient presents with:  Back Pain    Stated Complaint: upper back pain, wraps around her sides, nausea    HPI/Subjective:   HPI    Patient is a 43-year-old female who presents for evalu meningismus. No adenopathy  Lungs: No tachypnea. Lungs clear to auscultation bilaterally without rales/rhonchi. Equal breath sounds bilaterally  Cardiac: No tachycardia. No murmurs. Regular rate and rhythm. Abdomen: Soft and nontender throughout.   No Rhythm  Reading: Normal sinus rhythm. No acute ST-T wave changes. Ask intervals are noted. Otherwise, agree with EKG report              Back pain. Initially lower now more mid and upper back pain. Seems to be musculoskeletal.  EKG nondescript.   Tropo Face Mask

## 2022-12-06 ENCOUNTER — TELEPHONE (OUTPATIENT)
Dept: FAMILY MEDICINE CLINIC | Facility: CLINIC | Age: 42
End: 2022-12-06

## 2022-12-06 NOTE — TELEPHONE ENCOUNTER
1. What are your symptoms? Pt has tested positive for COVID, and has  extreme fatigue, body aches, headache,sinus congestion, sore throat, subsided cough. 2. How long have you been having these symptoms? This has lasted for about  4 days     3. Have you done anything already to treat your symptoms? Pt has been taking over the counter cold and flu medication. ADDITIONAL INFO:   Pt would like to receive a call back from triage nurse to further discuss this situation and get prescribed some medication to further help.      Please advise

## 2022-12-06 NOTE — TELEPHONE ENCOUNTER
S/W patient informed of Gaetano SILVA recommendation of supportive care. Advised signs and symptoms to call back or seek emergency department care. Patient agreeable to treatment plan.

## 2023-03-01 ENCOUNTER — OFFICE VISIT (OUTPATIENT)
Dept: PODIATRY CLINIC | Facility: CLINIC | Age: 43
End: 2023-03-01

## 2023-03-01 VITALS — DIASTOLIC BLOOD PRESSURE: 76 MMHG | SYSTOLIC BLOOD PRESSURE: 140 MMHG

## 2023-03-01 DIAGNOSIS — M25.571 ACUTE RIGHT ANKLE PAIN: ICD-10-CM

## 2023-03-01 DIAGNOSIS — M79.673 PAIN OF FOOT, UNSPECIFIED LATERALITY: Primary | ICD-10-CM

## 2023-03-01 DIAGNOSIS — M19.079 ANKLE ARTHRITIS: ICD-10-CM

## 2023-03-01 PROCEDURE — 3078F DIAST BP <80 MM HG: CPT | Performed by: STUDENT IN AN ORGANIZED HEALTH CARE EDUCATION/TRAINING PROGRAM

## 2023-03-01 PROCEDURE — 3077F SYST BP >= 140 MM HG: CPT | Performed by: STUDENT IN AN ORGANIZED HEALTH CARE EDUCATION/TRAINING PROGRAM

## 2023-03-01 PROCEDURE — 20605 DRAIN/INJ JOINT/BURSA W/O US: CPT | Performed by: STUDENT IN AN ORGANIZED HEALTH CARE EDUCATION/TRAINING PROGRAM

## 2023-03-01 PROCEDURE — 99213 OFFICE O/P EST LOW 20 MIN: CPT | Performed by: STUDENT IN AN ORGANIZED HEALTH CARE EDUCATION/TRAINING PROGRAM

## 2023-03-01 RX ORDER — DEXAMETHASONE SODIUM PHOSPHATE 4 MG/ML
2 VIAL (ML) INJECTION ONCE
Status: COMPLETED | OUTPATIENT
Start: 2023-03-01 | End: 2023-03-01

## 2023-03-02 NOTE — PROGRESS NOTES
Per Dr Louise Nevarez  to draw up .5ml of dexamethasone sodium phosphate, .5ml kenalog-10 and 1ml marcaine 0.5% for a right foot injection.

## 2023-03-07 NOTE — PATIENT INSTRUCTIONS
-Monitor response to ankle injection. Ambulate with supportive shoes and ankle compressive sleeve. Follow-up in 1 month for reevaluation or sooner if any other concerns arise. Can consider MRI if symptoms worsen or fail to improve.

## 2023-04-05 ENCOUNTER — OFFICE VISIT (OUTPATIENT)
Dept: PODIATRY CLINIC | Facility: CLINIC | Age: 43
End: 2023-04-05

## 2023-04-05 DIAGNOSIS — M76.811 ANTERIOR TIBIAL TENDONITIS, RIGHT: ICD-10-CM

## 2023-04-05 DIAGNOSIS — M19.079 ANKLE ARTHRITIS: Primary | ICD-10-CM

## 2023-04-05 DIAGNOSIS — M25.571 ACUTE RIGHT ANKLE PAIN: ICD-10-CM

## 2023-04-05 PROCEDURE — 99213 OFFICE O/P EST LOW 20 MIN: CPT | Performed by: STUDENT IN AN ORGANIZED HEALTH CARE EDUCATION/TRAINING PROGRAM

## 2023-04-05 RX ORDER — METHYLPREDNISOLONE 4 MG/1
TABLET ORAL
Qty: 21 TABLET | Refills: 0 | Status: SHIPPED | OUTPATIENT
Start: 2023-04-05

## 2023-04-09 NOTE — PATIENT INSTRUCTIONS
-Take medrol dosepak as prescribed.  -Complete MRI as ordered.  -Will follow up once MRI is complete.

## 2023-04-13 ENCOUNTER — PATIENT MESSAGE (OUTPATIENT)
Dept: PODIATRY CLINIC | Facility: CLINIC | Age: 43
End: 2023-04-13

## 2023-04-17 ENCOUNTER — TELEPHONE (OUTPATIENT)
Dept: WOUND CARE | Facility: HOSPITAL | Age: 43
End: 2023-04-17

## 2023-04-17 NOTE — TELEPHONE ENCOUNTER
Spoke with patient on telephone with MRI results. Can we please schedule follow-up visit in the next 1 to 2 weeks for cortisone injection to TN joint? Thanks.

## 2023-04-18 ENCOUNTER — PATIENT MESSAGE (OUTPATIENT)
Dept: PODIATRY CLINIC | Facility: CLINIC | Age: 43
End: 2023-04-18

## 2023-04-18 NOTE — TELEPHONE ENCOUNTER
Pt has appt on 4/27. Do you want her to get boot sooner and put in order for  or ok to wait until appt?

## 2023-04-27 ENCOUNTER — OFFICE VISIT (OUTPATIENT)
Dept: PODIATRY CLINIC | Facility: CLINIC | Age: 43
End: 2023-04-27

## 2023-04-27 VITALS — SYSTOLIC BLOOD PRESSURE: 140 MMHG | DIASTOLIC BLOOD PRESSURE: 76 MMHG

## 2023-04-27 DIAGNOSIS — E66.01 MORBID OBESITY WITH BMI OF 45.0-49.9, ADULT (HCC): ICD-10-CM

## 2023-04-27 DIAGNOSIS — M25.571 ACUTE RIGHT ANKLE PAIN: ICD-10-CM

## 2023-04-27 DIAGNOSIS — M19.079 OSTEOARTHRITIS OF TALONAVICULAR JOINT: Primary | ICD-10-CM

## 2023-04-27 DIAGNOSIS — M79.671 RIGHT FOOT PAIN: ICD-10-CM

## 2023-04-27 PROCEDURE — 3077F SYST BP >= 140 MM HG: CPT | Performed by: STUDENT IN AN ORGANIZED HEALTH CARE EDUCATION/TRAINING PROGRAM

## 2023-04-27 PROCEDURE — 20600 DRAIN/INJ JOINT/BURSA W/O US: CPT | Performed by: STUDENT IN AN ORGANIZED HEALTH CARE EDUCATION/TRAINING PROGRAM

## 2023-04-27 PROCEDURE — 3078F DIAST BP <80 MM HG: CPT | Performed by: STUDENT IN AN ORGANIZED HEALTH CARE EDUCATION/TRAINING PROGRAM

## 2023-04-27 PROCEDURE — L4361 PNEUMA/VAC WALK BOOT PRE OTS: HCPCS | Performed by: STUDENT IN AN ORGANIZED HEALTH CARE EDUCATION/TRAINING PROGRAM

## 2023-04-27 RX ORDER — DEXAMETHASONE SODIUM PHOSPHATE 4 MG/ML
2 VIAL (ML) INJECTION ONCE
Status: COMPLETED | OUTPATIENT
Start: 2023-04-27 | End: 2023-04-27

## 2023-04-27 NOTE — PROGRESS NOTES
Per Dr Noa Fink  to draw up .5ml of dexamethasone sodium phosphate, .5ml kenalog-10 and 1ml marcaine 0.5% for a right foot injection.

## 2023-04-28 NOTE — PATIENT INSTRUCTIONS
-Monitor response to cortisone injection. Remain minimally weightbearing in cam boot over the next 1 to 2 weeks. Follow-up in 2 weeks for reevaluation. If no improvement, may consider referral to Dr. Claudell Ache for second opinion.

## 2023-05-18 ENCOUNTER — OFFICE VISIT (OUTPATIENT)
Dept: PODIATRY CLINIC | Facility: CLINIC | Age: 43
End: 2023-05-18

## 2023-05-18 DIAGNOSIS — M79.671 RIGHT FOOT PAIN: ICD-10-CM

## 2023-05-18 DIAGNOSIS — M19.079 OSTEOARTHRITIS OF TALONAVICULAR JOINT: Primary | ICD-10-CM

## 2023-05-18 DIAGNOSIS — M76.811 ANTERIOR TIBIAL TENDONITIS, RIGHT: ICD-10-CM

## 2023-05-18 DIAGNOSIS — M77.50 TENDONITIS OF ANKLE OR FOOT: ICD-10-CM

## 2023-05-18 PROCEDURE — 99213 OFFICE O/P EST LOW 20 MIN: CPT | Performed by: STUDENT IN AN ORGANIZED HEALTH CARE EDUCATION/TRAINING PROGRAM

## 2023-05-21 NOTE — PATIENT INSTRUCTIONS
Okay to slowly transition from cam boot to supportive shoes as tolerated. Complete physical therapy as ordered. If symptoms worsen will consider referral to Dr. Jose Gonzalez for second opinion.

## 2023-06-23 ENCOUNTER — TELEPHONE (OUTPATIENT)
Dept: PHYSICAL THERAPY | Facility: HOSPITAL | Age: 43
End: 2023-06-23

## 2023-06-26 ENCOUNTER — TELEPHONE (OUTPATIENT)
Dept: PHYSICAL THERAPY | Facility: HOSPITAL | Age: 43
End: 2023-06-26

## 2023-06-29 ENCOUNTER — OFFICE VISIT (OUTPATIENT)
Dept: PHYSICAL THERAPY | Facility: HOSPITAL | Age: 43
End: 2023-06-29
Attending: STUDENT IN AN ORGANIZED HEALTH CARE EDUCATION/TRAINING PROGRAM
Payer: COMMERCIAL

## 2023-06-29 DIAGNOSIS — M19.079 OSTEOARTHRITIS OF TALONAVICULAR JOINT: Primary | ICD-10-CM

## 2023-06-29 DIAGNOSIS — M76.811 ANTERIOR TIBIAL TENDONITIS, RIGHT: ICD-10-CM

## 2023-06-29 DIAGNOSIS — M79.671 RIGHT FOOT PAIN: ICD-10-CM

## 2023-06-29 DIAGNOSIS — M77.50 TENDONITIS OF ANKLE OR FOOT: ICD-10-CM

## 2023-06-29 PROCEDURE — 97162 PT EVAL MOD COMPLEX 30 MIN: CPT

## 2023-06-29 PROCEDURE — 97110 THERAPEUTIC EXERCISES: CPT

## 2023-07-07 NOTE — PROGRESS NOTES
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 The day after evaluation, the next day felt like had whole new foot on her. Reports lasted 1.5 days and felt good, slowly crept back to what it was. All pain originally started top of foot, front of ankle. Pain gets worse when inside of ankle gets bigger. Mowed the yard which aggravated foot. Pain: 5/10 pain moves, can be inside ankle to top of foot       Objective:   Hip abduction mmt: 4/5 B    EVAL:   Flexibility:  Hip Flexor: lacking neutral hip extension B  Hamstrings:   SLR: R 65; L 63  90/90: R 32 away; L side 40 away   Gastroc-soleus: Closed chain DF: L 25; R 18         Assessment: Reports significant improvement in symptoms x 1.5 days after evaluation after manual therapy. Still with anterior ankle pain with DF with bent knee, none with straight knee gastrocnemius stretch per report. Added in self MWM on step for posterior talar glide for home. Gets some top of foot pain with landing in PF unilateral from step. B mild symptoms. Trial P-A 4th and 5th as well as 3rd and 4th MT and improved. Instructed in self as well prn.      Patient was instructed in and issued a HEP for: standing gastrocnemius stretch 2 x 25s, 2x daily; arch lift standing 3s x 10, 2 set; B heel raise 2 x 15; self MWM post talar glide 2 x 10         Goals:  (to be met in 10 visits)  Pt will demonstrate improved DF AROM to >= 10 degrees to promote proper foot clearance during gait and greater ease descending stairs without compensation  Pt will increase plantar flexion strength B to be able to complete 5 SL heel raises for push off in gait   Pt will report <2/10 pain with work and home activities such as walking kids camps at work  Pt will  be able to complete 6MWT with no increased pain. Pt will be independent and compliant with comprehensive HEP to maintain progress achieved in PT       Plan: closed chain soleus ROM assessment; hip ER? Single limb PF on shuttle   Date: 7/7/2023  TX#: 2/10 Date:                 TX#: 3/ Date:                 TX#: 4/ Date:                 TX#: 5/ Date:    Tx#: 6/   Therapeutic Exercise:   Subj hx  Heel raise 2 x 15  Ed on arthrokinematics TC joint using foot model   10 hip abd SL ea   HEP upgrade        Neuro Re-Ed:  Posterior talar glide self with band on step 10x   Arch lift 3s x 10 ea , 2 sets   Instructed self MT mobilizations        Manual Therapy  Posterior talar glide G3 with knee bent on step 1.5' G3   Posterior talar on step by PT MWM  2 x 10  Posterior talar glide in supine with passive DF G3  G3 3 and 4 and 4 and 5 MT mobilizations                  Charges: Manual x 1 (20'); TE x 1 (13'); NR x 1 (10')       Total Timed Treatment: 43 min  Total Treatment Time: 43 min

## 2023-07-10 ENCOUNTER — OFFICE VISIT (OUTPATIENT)
Dept: PHYSICAL THERAPY | Facility: HOSPITAL | Age: 43
End: 2023-07-10
Attending: STUDENT IN AN ORGANIZED HEALTH CARE EDUCATION/TRAINING PROGRAM
Payer: COMMERCIAL

## 2023-07-10 PROCEDURE — 97140 MANUAL THERAPY 1/> REGIONS: CPT

## 2023-07-10 PROCEDURE — 97110 THERAPEUTIC EXERCISES: CPT

## 2023-07-10 PROCEDURE — 97112 NEUROMUSCULAR REEDUCATION: CPT

## 2023-07-13 ENCOUNTER — OFFICE VISIT (OUTPATIENT)
Dept: PHYSICAL THERAPY | Facility: HOSPITAL | Age: 43
End: 2023-07-13
Attending: STUDENT IN AN ORGANIZED HEALTH CARE EDUCATION/TRAINING PROGRAM
Payer: COMMERCIAL

## 2023-07-13 PROCEDURE — 97140 MANUAL THERAPY 1/> REGIONS: CPT

## 2023-07-13 PROCEDURE — 97110 THERAPEUTIC EXERCISES: CPT

## 2023-07-13 PROCEDURE — 97112 NEUROMUSCULAR REEDUCATION: CPT

## 2023-07-13 NOTE — PROGRESS NOTES
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 Still has pain but does feel the exercises are helping. First day did heel raise no big deal, then started twice per day. Towards last few feels top of foot but also underneath. Feels more in the foot than the calf though does feel is helpful for calf strengthening. Has been more conscious of walking. Does feel that the foot pain comes on first, then the inside of ankle and sometimes top of ankle. Pain:  5/10 average pain past few days more at top foot       Objective:     Palpation: TTP add/abd in between 3/4 and 4//5 ; mild TTP ext digitorum mm belly   MMT digit extensors: 5/5 B without pain    Closed chain DF: 35, bent 40 (feels at anterior ankle here)  Straight knee: R 25, bent 30 (mild anterior ankle pain)    (7/10/23)  Hip abduction mmt: 4/5 B      EVAL:   Flexibility:  Hip Flexor: lacking neutral hip extension B  Hamstrings:   SLR: R 65; L 63  90/90: R 32 away; L side 40 away   Gastroc-soleus: Closed chain DF: L 25; R 18         Assessment: Still with about 10 dg difference DF with straight and bent knee. With bent knee, B feels anterior ankle, but sooner on R. Getting foot pain lateral and dorsal surface primarily but plantar possibly in walking as walks prolonged, especially with toe off. STM performed add/abd between 3 and 4 and 4 and 5. Does report improved symptoms then with walking. Followed up with work on abd and add of digits. With abd, initially with difficulty. Use of place and hold, then able to perform independently.  Discussed hold on heel raise, trial open chain but still getting more foot pain than calf work so held. Upgraded HEP to listed below. Also trial with leukotape for posterior tibialis. Risks and benefits of tape explained to patient. Instructed to remove tape after 4 days, or sooner if signs skin irritation such as itching/burning. Patient agreeable to taping. Planning to add in some inversion and eversion strengthening next week to address here. Patient was instructed in and issued a HEP for: standing gastrocnemius stretch 2 x 25s, 2x daily; arch lift standing 3s x 10, 2 set;  self MWM post talar glide 2 x 10         Goals:  (to be met in 10 visits)  Pt will demonstrate improved DF AROM to >= 10 degrees to promote proper foot clearance during gait and greater ease descending stairs without compensation (MET closed chain, upgraded to within 5 dg non-affected side)  Pt will increase plantar flexion strength B to be able to complete 5 SL heel raises for push off in gait   Pt will report <2/10 pain with work and home activities such as walking kids camps at work  Pt will  be able to complete 6MWT with no increased pain. Pt will be independent and compliant with comprehensive HEP to maintain progress achieved in PT       Plan: inversion/eversion ankle; MMT toe flexors? Date: 7/10/2023  TX#: 2/10 Date:   7/13/2023              TX#: 3/ Date:                 TX#: 4/ Date:                 TX#: 5/ Date:    Tx#: 6/   Therapeutic Exercise:   Subj hx  Heel raise 2 x 15  Ed on arthrokinematics TC joint using foot model   10 hip abd SL ea   HEP upgrade  Therapeutic Exercise:   Subj hx and re-assessment-see obj  Trial open chain PF-held   Discussion MRI relative to symptoms  HEP upgrade, review self P talar glide       Neuro Re-Ed:  Posterior talar glide self with band on step 10x   Arch lift 3s x 10 ea , 2 sets   Instructed self MT mobilizations  Neuro Re-Ed:  Abd digits, place and hold  Abd self digits 10, 2s hold  Add x 10  Leukotape for inversion/posterior tibialis support       Manual Therapy  Posterior talar glide G3 with knee bent on step 1.5' G3   Posterior talar on step by PT MWM  2 x 10  Posterior talar glide in supine with passive DF G3  G3 3 and 4 and 4 and 5 MT mobilizations Manual Therapy  STM and trigger point release abd/add 3 and 4, 4 and 5                   Charges: Manual x 1 (10'); TE x 2 (30'); NR x 1 (15')       Total Timed Treatment: 55 min  Total Treatment Time: 55 min

## 2023-07-17 ENCOUNTER — OFFICE VISIT (OUTPATIENT)
Dept: PHYSICAL THERAPY | Facility: HOSPITAL | Age: 43
End: 2023-07-17
Attending: STUDENT IN AN ORGANIZED HEALTH CARE EDUCATION/TRAINING PROGRAM
Payer: COMMERCIAL

## 2023-07-17 PROCEDURE — 97140 MANUAL THERAPY 1/> REGIONS: CPT

## 2023-07-17 PROCEDURE — 97112 NEUROMUSCULAR REEDUCATION: CPT

## 2023-07-17 PROCEDURE — 97110 THERAPEUTIC EXERCISES: CPT

## 2023-07-17 NOTE — PROGRESS NOTES
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 Ankle inner symptom has felt pretty darn good, sometimes with toe stretching would feel pull inside of ankle. Front of foot symptom still there, still doesn't know if top or bottom. Sometimes feels like might be connected to big toe, but mostly more outside. Feels soft tissue was helpful for front of foot, in last 4 days hasn't gotten to point where had to limp  Pain:  P1) top of foot pain, average last 4 days 3/10  P2) inner ankle, average 2-3/10       Objective:     Palpation: TTP add/abd in between 3/4 and 4//5 ; mild TTP ext digitorum mm belly   MMT digit extensors: 5/5 B without pain    Closed chain DF: 35, bent 40 (feels at anterior ankle here)  Straight knee: R 25, bent 30 (mild anterior ankle pain)    (7/10/23)  Hip abduction mmt: 4/5 B      EVAL:   Flexibility:  Hip Flexor: lacking neutral hip extension B  Hamstrings:   SLR: R 65; L 63  90/90: R 32 away; L side 40 away   Gastroc-soleus: Closed chain DF: L 25; R 18         Assessment: Felt improvement to lateral front of foot pain following last session as well as inside ankle pain. Feels tape was helpful. Added inversion and eversion YTB 2 x 10 in session with good tolerance, added to HEP as well with YTB. Continued STM add/abd and progressed to standing, improved control noted since last visit. Taped again for posterior tib support in inversion and instructed to remove Wednesday or sooner if skin irritation. Removed tape from Thursday and no redness or skin changes.  Continued work on posterior talar glide both long sitting and on step. Still can feel \"pinch\"end range so cued for flexion to stop prior to pinch. Paula Dee remains highly motivated and compliant in therapy. Patient was instructed in and issued a HEP for: standing gastrocnemius stretch 2 x 25s, 2x daily; arch lift standing 3s x 10, 2 set;  self MWM post talar glide 2 x 10  Inversion/eversion YTB 2 x 10 ea          Goals:  (to be met in 10 visits)  Pt will demonstrate improved DF AROM to >= 10 degrees to promote proper foot clearance during gait and greater ease descending stairs without compensation (MET closed chain, upgraded to within 5 dg non-affected side)  Pt will increase plantar flexion strength B to be able to complete 5 SL heel raises for push off in gait   Pt will report <2/10 pain with work and home activities such as walking kids camps at work  Pt will  be able to complete 6MWT with no increased pain. Pt will be independent and compliant with comprehensive HEP to maintain progress achieved in PT       Plan: inversion/eversion ankle; MMT toe flexors? Date: 7/10/2023  TX#: 2/10 Date:   7/13/2023              TX#: 3/ Date:  7/17/2023               TX#: 4/ Date:                 TX#: 5/ Date:    Tx#: 6/   Therapeutic Exercise:   Subj hx  Heel raise 2 x 15  Ed on arthrokinematics TC joint using foot model   10 hip abd SL ea   HEP upgrade  Therapeutic Exercise:   Subj hx and re-assessment-see obj  Trial open chain PF-held   Discussion MRI relative to symptoms  HEP upgrade, review self P talar glide  Therapeutic Exercise:   Subj hx  Inversion band YTB 2 x 10  Eversion band YTB 2 x 10   Gastroc stretch R 2 x 30s      Neuro Re-Ed:  Posterior talar glide self with band on step 10x   Arch lift 3s x 10 ea , 2 sets   Instructed self MT mobilizations  Neuro Re-Ed:  Abd digits, place and hold  Abd self digits 10, 2s hold  Add x 10  Leukotape for inversion/posterior tibialis support  Neuro Re-Ed:  Abd digits x 10 seated  Abd digits x 10 standing   Add digits x 10 standing   Leukotape for inversion/posterior tibialis support      Manual Therapy  Posterior talar glide G3 with knee bent on step 1.5' G3   Posterior talar on step by PT MWM  2 x 10  Posterior talar glide in supine with passive DF G3  G3 3 and 4 and 4 and 5 MT mobilizations Manual Therapy  STM and trigger point release abd/add 3 and 4, 4 and 5   Manual Therapy  Post talar glide in long sitting G4 with passive DF  STM and trigger point release abd/add 3 and 4, 4 and 5  Great toe distraction , then combined with passive flexion   P-A and A-P first MTP G3  MWM P talar glide on step 2 x 10                Charges: Manual x 1 (18'); TE x 1 (10'); NR x 1 (10)       Total Timed Treatment: 38 min  Total Treatment Time: 40 min

## 2023-07-19 NOTE — PROGRESS NOTES
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 Took tape off yesterday as discussed. Hadn't noticed too much swelling for last few times and then today with no support there, more swollen inner ankle. Reports much more sore here. Did already ice when got off work. Pain:  P1) top of foot pain, none since Monday  P2) inner ankle, up to 5/10 today with walking, denies at rest       Objective:   7/13/23:  Palpation: TTP add/abd in between 3/4 and 4//5 ; mild TTP ext digitorum mm belly   MMT digit extensors: 5/5 B without pain    Closed chain DF: 35, bent 40 (feels at anterior ankle here)  Straight knee: R 25, bent 30 (mild anterior ankle pain)    (7/10/23)  Hip abduction mmt: 4/5 B        Assessment: Increased irritability this date of symptoms, feels was walking more and more quickly. Increased swelling posterior to medial malleolus and on malleolus. TTP along posterior tibialis distribution. Initial pain with passive great toe PF but with MTP P-A first ray resolves again as did in last session. Continued STM add/abd 4th and 5th rays, patient denies symptoms here since Monday. Continued taping to offload posterior tibialis. Discussed ice post band ex. Will continue to graded load posterior tibialis as able to improve tolerance for walking/standing etc when calmed.      Patient not on schedule next week: discussed can progress to 3 x 10 inv/ev if no longer getting soreness; can progress to B heel raise if no front of foot pain with this    Patient was instructed in and issued a HEP for: standing gastrocnemius stretch 2 x 25s, 2x daily;  self MWM post talar glide 2 x 10  Inversion/eversion YTB 2 x 10 ea , BTB PF 2 x 10; abd/add digits          Goals:  (to be met in 10 visits)  Pt will demonstrate improved DF AROM to >= 10 degrees to promote proper foot clearance during gait and greater ease descending stairs without compensation (MET closed chain, upgraded to within 5 dg non-affected side)  Pt will increase plantar flexion strength B to be able to complete 5 SL heel raises for push off in gait   Pt will report <2/10 pain with work and home activities such as walking kids camps at work  Pt will  be able to complete 6MWT with no increased pain. Pt will be independent and compliant with comprehensive HEP to maintain progress achieved in PT       Plan: progress strength as tolerated  Date: 7/10/2023  TX#: 2/10 Date:   7/13/2023              TX#: 3/ Date:  7/17/2023               TX#: 4/ Date:     7/20/23            TX#: 5/ Date:    Tx#: 6/   Therapeutic Exercise:   Subj hx  Heel raise 2 x 15  Ed on arthrokinematics TC joint using foot model   10 hip abd SL ea   HEP upgrade  Therapeutic Exercise:   Subj hx and re-assessment-see obj  Trial open chain PF-held   Discussion MRI relative to symptoms  HEP upgrade, review self P talar glide  Therapeutic Exercise:   Subj hx  Inversion band YTB 2 x 10  Eversion band YTB 2 x 10   Gastroc stretch R 2 x 30s  Therapeutic Exercise:   PF band BTB 2 x 10 long sitting   Discussion HEP and progression    Neuro Re-Ed:  Posterior talar glide self with band on step 10x   Arch lift 3s x 10 ea , 2 sets   Instructed self MT mobilizations  Neuro Re-Ed:  Abd digits, place and hold  Abd self digits 10, 2s hold  Add x 10  Leukotape for inversion/posterior tibialis support  Neuro Re-Ed:  Abd digits x 10 seated  Abd digits x 10 standing   Add digits x 10 standing   Leukotape for inversion/posterior tibialis support  Neuro Re-Ed:  Leukotape for inversion/posterior tibialis support     Manual Therapy  Posterior talar glide G3 with knee bent on step 1.5' G3   Posterior talar on step by PT MWM  2 x 10  Posterior talar glide in supine with passive DF G3  G3 3 and 4 and 4 and 5 MT mobilizations Manual Therapy  STM and trigger point release abd/add 3 and 4, 4 and 5   Manual Therapy  Post talar glide in long sitting G4 with passive DF  STM and trigger point release abd/add 3 and 4, 4 and 5  Great toe distraction , then combined with passive flexion   P-A and A-P first MTP G3  MWM P talar glide on step 2 x 10 Manual Therapy  Post talar glide in long sitting G4 with passive DF, knee ext and flexed  STM and trigger point release abd/add 3 and 4, 4 and 5  Great toe distraction , then combined with passive flexion   P-A cuneiform on navicular G2 up to G3 2 x 10, feels relief        Vasopneumatic compression R ankle 34 dg, 10'         Charges: Manual x2 (23'); TE x 0 (7'); vasopneumatic compresion x 1   Total Timed Treatment: 35 min  Total Treatment Time: 48 min

## 2023-07-20 ENCOUNTER — OFFICE VISIT (OUTPATIENT)
Dept: PHYSICAL THERAPY | Facility: HOSPITAL | Age: 43
End: 2023-07-20
Attending: STUDENT IN AN ORGANIZED HEALTH CARE EDUCATION/TRAINING PROGRAM
Payer: COMMERCIAL

## 2023-07-20 PROCEDURE — 97016 VASOPNEUMATIC DEVICE THERAPY: CPT

## 2023-07-20 PROCEDURE — 97140 MANUAL THERAPY 1/> REGIONS: CPT

## 2023-07-24 ENCOUNTER — APPOINTMENT (OUTPATIENT)
Dept: PHYSICAL THERAPY | Facility: HOSPITAL | Age: 43
End: 2023-07-24
Attending: STUDENT IN AN ORGANIZED HEALTH CARE EDUCATION/TRAINING PROGRAM
Payer: COMMERCIAL

## 2023-07-25 ENCOUNTER — OFFICE VISIT (OUTPATIENT)
Dept: PHYSICAL THERAPY | Facility: HOSPITAL | Age: 43
End: 2023-07-25
Attending: STUDENT IN AN ORGANIZED HEALTH CARE EDUCATION/TRAINING PROGRAM
Payer: COMMERCIAL

## 2023-07-25 PROCEDURE — 97140 MANUAL THERAPY 1/> REGIONS: CPT

## 2023-07-25 PROCEDURE — 97112 NEUROMUSCULAR REEDUCATION: CPT

## 2023-07-25 PROCEDURE — 97110 THERAPEUTIC EXERCISES: CPT

## 2023-07-25 NOTE — PROGRESS NOTES
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 Was in flip flops just a few steps here and there\". Kept tape on, peeled off yesterday. Went into tennis shoes yesterday and today and unloading car, moving more quickly, back to work. Big toe feels a little achy. Still inner ankle. Doesn't think has had any front of foot symptoms. Pain:  P1) top of foot pain, none since last Monday  P2) inner ankle, 3/10, when walking; denies at rest       Objective:   7/25/2023  Initial pain with passive great toe plantar flexion, improves following MTP P-A and distraction  TTP along posterior tibialis distribution at insertion and into mm belly    7/13/23:  Palpation: TTP add/abd in between 3/4 and 4//5 ; mild TTP ext digitorum mm belly   MMT digit extensors: 5/5 B without pain    Closed chain DF: 35, bent 40 (feels at anterior ankle here)  Straight knee: R 25, bent 30 (mild anterior ankle pain)      Assessment:  Less irritability at medial ankle but still present. Denies significant soreness with band ex inversion/eversion so progressed to 3 sets. No longer with foot pain with heel raises so progressed from band plantar flexion. Also worked intrinsics of foot and ankle with tandem stance with head motions. Added in STM along posterior tibialis to relax and lengthen tissue now that not as irritable. Continues to feel relief with taping so use of leukotape, will take off in 4 days or sooner. Discussed goal to improve functional strength that does not need the tape as progresses.  Also still with intermittent great toe MTP pain. Continues to feel relief with P-A and distraction. Instructed in self distraction and then combined with passive flexion in pain-free range. Patient was instructed in and issued a HEP for: standing gastrocnemius stretch 2 x 25s, 2x daily;  self MWM post talar glide 2 x 10  Inversion/eversion YTB 3 x 10 ea , heel raise 2 x 10-15; abd/add digits          Goals:  (to be met in 10 visits)  Pt will demonstrate improved DF AROM to >= 10 degrees to promote proper foot clearance during gait and greater ease descending stairs without compensation (MET closed chain, upgraded to within 5 dg non-affected side)  Pt will increase plantar flexion strength B to be able to complete 5 SL heel raises for push off in gait   Pt will report <2/10 pain with work and home activities such as walking kids camps at work  Pt will  be able to complete 6MWT with no increased pain. Pt will be independent and compliant with comprehensive HEP to maintain progress achieved in PT       Plan: progress strength as tolerated  Date: 7/10/2023  TX#: 2/10 Date:   7/13/2023              TX#: 3/ Date:  7/17/2023               TX#: 4/ Date:     7/20/23            TX#: 5/ Date: 7/25/2023  Tx#: 6/   Therapeutic Exercise:   Subj hx  Heel raise 2 x 15  Ed on arthrokinematics TC joint using foot model   10 hip abd SL ea   HEP upgrade  Therapeutic Exercise:   Subj hx and re-assessment-see obj  Trial open chain PF-held   Discussion MRI relative to symptoms  HEP upgrade, review self P talar glide  Therapeutic Exercise:   Subj hx  Inversion band YTB 2 x 10  Eversion band YTB 2 x 10   Gastroc stretch R 2 x 30s  Therapeutic Exercise:   PF band BTB 2 x 10 long sitting   Discussion HEP and progression Therapeutic Exercise:    Inversion YTB 3 x 10  Eversion YTB 3 x 10   Heel raises B 2 x 15   Instructed in self great toe distraction and sustained with combined flexion painfree range      Neuro Re-Ed:  Posterior talar glide self with band on step 10x Arch lift 3s x 10 ea , 2 sets   Instructed self MT mobilizations  Neuro Re-Ed:  Abd digits, place and hold  Abd self digits 10, 2s hold  Add x 10  Leukotape for inversion/posterior tibialis support  Neuro Re-Ed:  Abd digits x 10 seated  Abd digits x 10 standing   Add digits x 10 standing   Leukotape for inversion/posterior tibialis support  Neuro Re-Ed:  Leukotape for inversion/posterior tibialis support  Neuro Re-Ed:  Tandem stance nods 10x   Tandem stance turns 10x ea   Leukotape for inversion/posterior tibialis support    Manual Therapy  Posterior talar glide G3 with knee bent on step 1.5' G3   Posterior talar on step by PT MWM  2 x 10  Posterior talar glide in supine with passive DF G3  G3 3 and 4 and 4 and 5 MT mobilizations Manual Therapy  STM and trigger point release abd/add 3 and 4, 4 and 5   Manual Therapy  Post talar glide in long sitting G4 with passive DF  STM and trigger point release abd/add 3 and 4, 4 and 5  Great toe distraction , then combined with passive flexion   P-A and A-P first MTP G3  MWM P talar glide on step 2 x 10 Manual Therapy  Post talar glide in long sitting G4 with passive DF, knee ext and flexed  STM and trigger point release abd/add 3 and 4, 4 and 5  Great toe distraction , then combined with passive flexion   P-A cuneiform on navicular G2 up to G3 2 x 10, feels relief  Manual Therapy  Post talar glide in long sitting G4 with passive DF, knee ext and flexed  STM posterior tibialis  Great toe distraction, then combined with plantar flexion       Vasopneumatic compression R ankle 34 dg, 10'         Charges: Manual x1 (15'); TE x 1 ('15); NR x 1 (10');     Total Timed Treatment: 40 min  Total Treatment Time: 40 min

## 2023-08-01 NOTE — PROGRESS NOTES
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 Has not been hurting as much. Pain:  P1) top of foot pain: none  P2) inner ankle: \"just sore      Objective:   7/25/2023  Initial pain with passive great toe plantar flexion, improves following MTP P-A and distraction  TTP along posterior tibialis distribution at insertion and into mm belly    7/13/23:  Palpation: TTP add/abd in between 3/4 and 4//5 ; mild TTP ext digitorum mm belly   MMT digit extensors: 5/5 B without pain    Closed chain DF: 35, bent 40 (feels at anterior ankle here)  Straight knee: R 25, bent 30 (mild anterior ankle pain)      Assessment: Overall reports improvement, still with medial ankle pain and s/s posterior tibialis dysfunction. Still with some anterior ankle tightness with knee bent DF. Continued mobilizations and then Myofascial release performed to relax and lengthens soft tissues to increase function and flexibility as well as reduce inflammation and encourage tissue healing along posterior tibialis region. Trial RTB in session with inversion, discussed progression with reps with YTB for home, issued RTB to alter to by next week as able.      Patient was instructed in and issued a HEP for: standing gastrocnemius stretch 2 x 25s, 2x daily;  self MWM post talar glide 2 x 10  Inversion/eversion YTB 3 x 15 ea (can progress to RTB as able) , heel raise 2 x 10-15; abd/add digits   Tandem head turns/nods          Goals:  (to be met in 10 visits)  Pt will demonstrate improved DF AROM to >= 10 degrees to promote proper foot clearance during gait and greater ease descending stairs without compensation (MET closed chain, upgraded to within 5 dg non-affected side)  Pt will increase plantar flexion strength B to be able to complete 5 SL heel raises for push off in gait   Pt will report <2/10 pain with work and home activities such as walking kids camps at work  Pt will  be able to complete 6MWT with no increased pain. Pt will be independent and compliant with comprehensive HEP to maintain progress achieved in PT       Plan: progress strength as tolerated  Date: 7/10/2023  TX#: 2/10 Date:   7/13/2023              TX#: 3/ Date:  7/17/2023               TX#: 4/ Date:     7/20/23            TX#: 5/ Date: 7/25/2023  Tx#: 6/ 8/2/23  7/   Therapeutic Exercise:   Subj hx  Heel raise 2 x 15  Ed on arthrokinematics TC joint using foot model   10 hip abd SL ea   HEP upgrade  Therapeutic Exercise:   Subj hx and re-assessment-see obj  Trial open chain PF-held   Discussion MRI relative to symptoms  HEP upgrade, review self P talar glide  Therapeutic Exercise:   Subj hx  Inversion band YTB 2 x 10  Eversion band YTB 2 x 10   Gastroc stretch R 2 x 30s  Therapeutic Exercise:   PF band BTB 2 x 10 long sitting   Discussion HEP and progression Therapeutic Exercise:    Inversion YTB 3 x 10  Eversion YTB 3 x 10   Heel raises B 2 x 15   Instructed in self great toe distraction and sustained with combined flexion painfree range    Therapeutic Exercise:   Calf wedge stretch 3 x 30s   RTB eversion 2 x 10  YTB inversion 3 x 15   Neuro Re-Ed:  Posterior talar glide self with band on step 10x   Arch lift 3s x 10 ea , 2 sets   Instructed self MT mobilizations  Neuro Re-Ed:  Abd digits, place and hold  Abd self digits 10, 2s hold  Add x 10  Leukotape for inversion/posterior tibialis support  Neuro Re-Ed:  Abd digits x 10 seated  Abd digits x 10 standing   Add digits x 10 standing   Leukotape for inversion/posterior tibialis support  Neuro Re-Ed:  Leukotape for inversion/posterior tibialis support  Neuro Re-Ed:  Tandem stance nods 10x   Tandem stance turns 10x ea   Leukotape for inversion/posterior tibialis support  Neuro Re-Ed:  Airex FSU/down 10x ea   Airex LSU/down 10x   40s x 2 tandem stance airex   Leukotape for inversion/posterior tibialis support    Manual Therapy  Posterior talar glide G3 with knee bent on step 1.5' G3   Posterior talar on step by PT MWM  2 x 10  Posterior talar glide in supine with passive DF G3  G3 3 and 4 and 4 and 5 MT mobilizations Manual Therapy  STM and trigger point release abd/add 3 and 4, 4 and 5   Manual Therapy  Post talar glide in long sitting G4 with passive DF  STM and trigger point release abd/add 3 and 4, 4 and 5  Great toe distraction , then combined with passive flexion   P-A and A-P first MTP G3  MWM P talar glide on step 2 x 10 Manual Therapy  Post talar glide in long sitting G4 with passive DF, knee ext and flexed  STM and trigger point release abd/add 3 and 4, 4 and 5  Great toe distraction , then combined with passive flexion   P-A cuneiform on navicular G2 up to G3 2 x 10, feels relief  Manual Therapy  Post talar glide in long sitting G4 with passive DF, knee ext and flexed  STM posterior tibialis  Great toe distraction, then combined with plantar flexion  Manual Therapy  Post talar glide in long sitting G4 with passive DF, knee ext   MWM posterior talar glide 2 x 10 on step  MFR and STM along posterior tibialis      Vasopneumatic compression R ankle 34 dg, 10'          Charges: Manual x1 (17'); TE x 1 ('11); NR x 1 (15');     Total Timed Treatment: 43 min  Total Treatment Time: 43 min

## 2023-08-02 ENCOUNTER — OFFICE VISIT (OUTPATIENT)
Dept: PHYSICAL THERAPY | Facility: HOSPITAL | Age: 43
End: 2023-08-02
Attending: STUDENT IN AN ORGANIZED HEALTH CARE EDUCATION/TRAINING PROGRAM
Payer: COMMERCIAL

## 2023-08-02 PROCEDURE — 97110 THERAPEUTIC EXERCISES: CPT

## 2023-08-02 PROCEDURE — 97140 MANUAL THERAPY 1/> REGIONS: CPT

## 2023-08-02 PROCEDURE — 97112 NEUROMUSCULAR REEDUCATION: CPT

## 2023-08-09 NOTE — PROGRESS NOTES
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 provider found  Date of Evaluation:    6/29/23    Precautions:  None Next MD visit:   none scheduled  Date of Surgery: n/a   Insurance Primary/Secondary: BCBS POS / N/A     # Auth Visits: 10            Subjective: Overall doing pretty good. Had a big weekend walking at work 14.5 hour days. Most of it right now is inner arch. When flexes foot up, feels pull/stretch inside. Has not progressed to RTB because was sore after the big days of work so took a few days off. 3 x 15 YTB was going okay. No front of foot pain. Pain:    2/10 inner ankle       Objective:   7/25/2023  Initial pain with passive great toe plantar flexion, improves following MTP P-A and distraction  TTP along posterior tibialis distribution at insertion and into mm belly    7/13/23:  Palpation: TTP add/abd in between 3/4 and 4//5 ; mild TTP ext digitorum mm belly   MMT digit extensors: 5/5 B without pain    Closed chain DF: 35, bent 40 (feels at anterior ankle here)  Straight knee: R 25, bent 30 (mild anterior ankle pain)      Assessment: Still some mild tenderness more distal posterior tibialis and mm belly. Continued focus on on graded strengthening to tolerance. Instructed progress to RTB for inversion/eversion. Progressed to B heel raise up but unilateral down. B loss of full height with WS but can control without increased pain.      Patient was instructed in and issued a HEP for: standing gastrocnemius stretch 2 x 25s, 2x daily;  self MWM post talar glide 2 x 10  Inversion/eversion RTB 3 x 10 , heel raise 2 x 10-15; abd/add digits   Tandem head turns/nods          Goals:  (to be met in 10 visits)  Pt will demonstrate improved DF AROM to >= 10 degrees to promote proper foot clearance during gait and greater ease descending stairs without compensation (MET closed chain, upgraded to within 5 dg non-affected side)  Pt will increase plantar flexion strength B to be able to complete 5 SL heel raises for push off in gait (progressing)  Pt will report <2/10 pain with work and home activities such as walking kids camps at work  Pt will  be able to complete 6MWT with no increased pain. Pt will be independent and compliant with comprehensive HEP to maintain progress achieved in PT       Plan: progress strength as tolerated  Date:   7/13/2023              TX#: 3/ Date:  7/17/2023               TX#: 4/ Date:     7/20/23            TX#: 5/ Date: 7/25/2023  Tx#: 6/ 8/2/23 7/ 8/9/2023  8/   Therapeutic Exercise:   Subj hx and re-assessment-see obj  Trial open chain PF-held   Discussion MRI relative to symptoms  HEP upgrade, review self P talar glide  Therapeutic Exercise:   Subj hx  Inversion band YTB 2 x 10  Eversion band YTB 2 x 10   Gastroc stretch R 2 x 30s  Therapeutic Exercise:   PF band BTB 2 x 10 long sitting   Discussion HEP and progression Therapeutic Exercise:    Inversion YTB 3 x 10  Eversion YTB 3 x 10   Heel raises B 2 x 15   Instructed in self great toe distraction and sustained with combined flexion painfree range    Therapeutic Exercise:   Calf wedge stretch 3 x 30s   RTB eversion 2 x 10  YTB inversion 3 x 15 Therapeutic Exercise:   RTB eversion 3 x 10  RTB inversion 3 x 10  Calf wedge stretch 3 x 30s   B heel raise with UL descent 8 reps ea , 2 sets    Neuro Re-Ed:  Abd digits, place and hold  Abd self digits 10, 2s hold  Add x 10  Leukotape for inversion/posterior tibialis support  Neuro Re-Ed:  Abd digits x 10 seated  Abd digits x 10 standing   Add digits x 10 standing   Leukotape for inversion/posterior tibialis support  Neuro Re-Ed:  Leukotape for inversion/posterior tibialis support  Neuro Re-Ed:  Tandem stance nods 10x   Tandem stance turns 10x ea   Leukotape for inversion/posterior tibialis support  Neuro Re-Ed:  Airex FSU/down 10x ea Airex LSU/down 10x   40s x 2 tandem stance airex   Leukotape for inversion/posterior tibialis support  Neuro Re-Ed:  Airex FSU/down 10x2 ea   Airex LSU/down 10x  Tandem walks 3 laps      Manual Therapy  STM and trigger point release abd/add 3 and 4, 4 and 5   Manual Therapy  Post talar glide in long sitting G4 with passive DF  STM and trigger point release abd/add 3 and 4, 4 and 5  Great toe distraction , then combined with passive flexion   P-A and A-P first MTP G3  MWM P talar glide on step 2 x 10 Manual Therapy  Post talar glide in long sitting G4 with passive DF, knee ext and flexed  STM and trigger point release abd/add 3 and 4, 4 and 5  Great toe distraction , then combined with passive flexion   P-A cuneiform on navicular G2 up to G3 2 x 10, feels relief  Manual Therapy  Post talar glide in long sitting G4 with passive DF, knee ext and flexed  STM posterior tibialis  Great toe distraction, then combined with plantar flexion  Manual Therapy  Post talar glide in long sitting G4 with passive DF, knee ext   MWM posterior talar glide 2 x 10 on step  MFR and STM along posterior tibialis Manual Therapy(15')  Post talar glide in long sitting G4 with passive DF, knee ext  STM along posterior tibialis      Vasopneumatic compression R ankle 34 dg, 10'           Charges: Manual x1 (15'); TE x 1 ('10); NR x 1 (15');     Total Timed Treatment: 40 min  Total Treatment Time: 40 min

## 2023-08-10 ENCOUNTER — OFFICE VISIT (OUTPATIENT)
Dept: PHYSICAL THERAPY | Facility: HOSPITAL | Age: 43
End: 2023-08-10
Attending: FAMILY MEDICINE
Payer: COMMERCIAL

## 2023-08-10 PROCEDURE — 97110 THERAPEUTIC EXERCISES: CPT

## 2023-08-10 PROCEDURE — 97140 MANUAL THERAPY 1/> REGIONS: CPT

## 2023-08-10 PROCEDURE — 97112 NEUROMUSCULAR REEDUCATION: CPT

## 2023-08-14 ENCOUNTER — APPOINTMENT (OUTPATIENT)
Dept: PHYSICAL THERAPY | Facility: HOSPITAL | Age: 43
End: 2023-08-14
Attending: FAMILY MEDICINE
Payer: COMMERCIAL

## 2023-08-15 ENCOUNTER — OFFICE VISIT (OUTPATIENT)
Dept: PHYSICAL THERAPY | Facility: HOSPITAL | Age: 43
End: 2023-08-15
Attending: STUDENT IN AN ORGANIZED HEALTH CARE EDUCATION/TRAINING PROGRAM
Payer: COMMERCIAL

## 2023-08-15 PROCEDURE — 97110 THERAPEUTIC EXERCISES: CPT

## 2023-08-15 PROCEDURE — 97140 MANUAL THERAPY 1/> REGIONS: CPT

## 2023-08-15 NOTE — PROGRESS NOTES
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 Since Thursday she has been pretty sore in the foot. There was progression of heel raise exercise with going up with 2, and down with 1. She tried a few reps at home with assisting it a little bit, it was better. She has a tightness along medial arch, which is achy. Pain:    2/10 inner ankle       Objective:   8/15:   DF open chain R: 0 deg  Subtalar glide into inversion performed, hypomobility noted, improved with mobilizations    7/25/2023  Initial pain with passive great toe plantar flexion, improves following MTP P-A and distraction  TTP along posterior tibialis distribution at insertion and into mm belly    7/13/23:  Palpation: TTP add/abd in between 3/4 and 4//5 ; mild TTP ext digitorum mm belly   MMT digit extensors: 5/5 B without pain    Closed chain DF: 35, bent 40 (feels at anterior ankle here)  Straight knee: R 25, bent 30 (mild anterior ankle pain)        Assessment: Will have the patient modify for now the heel raise exercise, preferring to perform lowering in single leg with assistance or the double leg heel raise. Performed single leg heel raise on the shuttle today, pt was able to perform this without pain. Added subtalar mobs to address pinching in the ankle, this did improve following the mobilization.     Patient was instructed in and issued a HEP for: standing gastrocnemius stretch 2 x 25s, 2x daily;  self MWM post talar glide 2 x 10  Inversion/eversion RTB 3 x 10 , heel raise 2 x 10-15; abd/add digits   Tandem head turns/nods          Goals:  (to be met in 10 visits)  Pt will demonstrate improved DF AROM to >= 10 degrees to promote proper foot clearance during gait and greater ease descending stairs without compensation (MET closed chain, upgraded to within 5 dg non-affected side)  Pt will increase plantar flexion strength B to be able to complete 5 SL heel raises for push off in gait (progressing)  Pt will report <2/10 pain with work and home activities such as walking kids camps at work  Pt will  be able to complete 6MWT with no increased pain. Pt will be independent and compliant with comprehensive HEP to maintain progress achieved in PT       Plan: progress strength as tolerated  Date:  7/17/2023               TX#: 4/ Date:     7/20/23            TX#: 5/ Date: 7/25/2023  Tx#: 6/ 8/2/23  7/ 8/9/2023  8/ 8/15/2023  9/   Therapeutic Exercise:   Subj hx  Inversion band YTB 2 x 10  Eversion band YTB 2 x 10   Gastroc stretch R 2 x 30s  Therapeutic Exercise:   PF band BTB 2 x 10 long sitting   Discussion HEP and progression Therapeutic Exercise:    Inversion YTB 3 x 10  Eversion YTB 3 x 10   Heel raises B 2 x 15   Instructed in self great toe distraction and sustained with combined flexion painfree range    Therapeutic Exercise:   Calf wedge stretch 3 x 30s   RTB eversion 2 x 10  YTB inversion 3 x 15 Therapeutic Exercise:   RTB eversion 3 x 10  RTB inversion 3 x 10  Calf wedge stretch 3 x 30s   B heel raise with UL descent 8 reps ea , 2 sets  Therapeutic Exercise:   RTB eversion 3 x 10  RTB inversion 3 x 10  Great toe flexion on green sponge 2x10  SL heel raise on shuttle 31# 2x10 on R   Neuro Re-Ed:  Abd digits x 10 seated  Abd digits x 10 standing   Add digits x 10 standing   Leukotape for inversion/posterior tibialis support  Neuro Re-Ed:  Leukotape for inversion/posterior tibialis support  Neuro Re-Ed:  Tandem stance nods 10x   Tandem stance turns 10x ea   Leukotape for inversion/posterior tibialis support  Neuro Re-Ed:  Airex FSU/down 10x ea   Airex LSU/down 10x 40s x 2 tandem stance airex   Leukotape for inversion/posterior tibialis support  Neuro Re-Ed:  Airex FSU/down 10x2 ea   Airex LSU/down 10x  Tandem walks 3 laps    N euro Re-Ed:  Tandem walks 2 laps     Manual Therapy  Post talar glide in long sitting G4 with passive DF  STM and trigger point release abd/add 3 and 4, 4 and 5  Great toe distraction , then combined with passive flexion   P-A and A-P first MTP G3  MWM P talar glide on step 2 x 10 Manual Therapy  Post talar glide in long sitting G4 with passive DF, knee ext and flexed  STM and trigger point release abd/add 3 and 4, 4 and 5  Great toe distraction , then combined with passive flexion   P-A cuneiform on navicular G2 up to G3 2 x 10, feels relief  Manual Therapy  Post talar glide in long sitting G4 with passive DF, knee ext and flexed  STM posterior tibialis  Great toe distraction, then combined with plantar flexion  Manual Therapy  Post talar glide in long sitting G4 with passive DF, knee ext   MWM posterior talar glide 2 x 10 on step  MFR and STM along posterior tibialis Manual Therapy(15')  Post talar glide in long sitting G4 with passive DF, knee ext  STM along posterior tibialis  Manual Therapy(15')  Post talar glide in long sitting G4 with passive DF, knee ext  STM along posterior tibialis   Subtalar mobs into inversion grade III-IV    Vasopneumatic compression R ankle 34 dg, 10'            Charges: Manual x1 (20'); TE x 2 (23') neuro re-ed x0 (3')   Total Timed Treatment: 46 min  Total Treatment Time: 46 min

## 2023-08-17 ENCOUNTER — APPOINTMENT (OUTPATIENT)
Dept: PHYSICAL THERAPY | Facility: HOSPITAL | Age: 43
End: 2023-08-17
Attending: FAMILY MEDICINE
Payer: COMMERCIAL

## 2023-08-18 NOTE — PROGRESS NOTES
Progress Summary  Pt has attended 10 visits in Physical Therapy. 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 provider found  Date of Evaluation:    6/29/23    Precautions:  None Next MD visit:   none scheduled  Date of Surgery: n/a   Insurance Primary/Secondary: BCBS POS / N/A     # Auth Visits: 10            Subjective: Sore and achy on side of foot. Has had plantar fasciitis before, starting to wake up in the AM with arch pulling and if standing and elevates toes. Felt fine after last session. Felt like achiness started back after trying the heel raises with unilateral lower. Notes she has been walking faster and more. Did buy a wedge for calf stretching and that has been helpful. Still no longer front of foot pain     Pain:    4/10 inner ankle and bottom of foot       Objective:   Functional strength: unable to do SL heel raise, unable to lower SL from double le heel raise without height loss  *at eval: could not complete B heel raise d/t pain primarily top of foot     Palpation: TTP along posterior tibialis at distal insertion and lower mm belly     DF open chain knee ext R: grossly 3           Assessment: Amari Bryan has completed 10 visits for her foot and ankle pain. At evaluation, multiple regions of pain, now much more localized to medial ankle/arch. No longer having top of the foot pain, and notes walking more/faster. Notes overall improvement but still with ache in medial ankle region and medial foot, also notes some arch tightness. Continued s/s of posterior tibialis irritation and some reduced talar glide with dorsiflexion. Progression with strengthening, cues today focus eccentric with inversion for home. Still with plantar flexion weakness and chronic calf tightness. Altered heel raises to off step to progress towards single limb. Recommending PT 1-2x/week x 6 additional visits to continue progress and maximize return to function. Patient was instructed in and issued a HEP for: standing gastrocnemius stretch 2 x 25s, 2x daily;  self MWM post talar glide 2 x 10  Inversion/eversion RTB 3 x 10 , slow ecc , heel raise 2 x 10-15; abd/add digits   Tandem head turns/nods          Goals:  (to be met in 16 visits)  Pt will demonstrate improved DF AROM to >= 10 degrees to promote proper foot clearance during gait and greater ease descending stairs without compensation (MET closed chain, upgraded to within 5 dg non-affected side)  Pt will increase plantar flexion strength B to be able to complete 5 SL heel raises for push off in gait (progressing)  Pt will report <2/10 pain with work and home activities such as walking kids camps at work (progressing, less regions of pain)  Pt will  be able to complete 6MWT with no increased pain. Pt will be independent and compliant with comprehensive HEP to maintain progress achieved in PT       Plan: Continue skilled Physical Therapy 1-2 x/week or a total of 6 visits over a 90 day period. Treatment will include: therapeutic exercise including ROM, strengthening, stretching program; neuromuscular re-education for balance and proprioception, patient education for posture, body mechanics, ergonomics; modalities as needed; manual therapy to include joint mobilization, distraction, and soft tissue mobilization as needed; HEP instruction and progression          Patient/Family/Caregiver was advised of these findings, precautions, and treatment options and has agreed to actively participate in planning and for this course of care. Thank you for your referral. If you have any questions, please contact me at Dept: [phone].     Sincerely,  Electronically signed by therapist: Haresh Venegas PT     Physician's certification required:  Yes  Please co-sign or sign and return this letter via fax as soon as possible to [phone]. I certify the need for these services furnished under this plan of treatment and while under my care. X___________________________________________________ Date____________________    Certification From: 0/98/2633  To:11/16/2023     Date:     7/20/23            TX#: 5/ Date: 7/25/2023  Tx#: 6/ 8/2/23  7/ 8/9/2023  8/ 8/15/2023  9/ 8/21/23  10/   Therapeutic Exercise:   PF band BTB 2 x 10 long sitting   Discussion HEP and progression Therapeutic Exercise:    Inversion YTB 3 x 10  Eversion YTB 3 x 10   Heel raises B 2 x 15   Instructed in self great toe distraction and sustained with combined flexion painfree range    Therapeutic Exercise:   Calf wedge stretch 3 x 30s   RTB eversion 2 x 10  YTB inversion 3 x 15 Therapeutic Exercise:   RTB eversion 3 x 10  RTB inversion 3 x 10  Calf wedge stretch 3 x 30s   B heel raise with UL descent 8 reps ea , 2 sets  Therapeutic Exercise:   RTB eversion 3 x 10  RTB inversion 3 x 10  Great toe flexion on green sponge 2x10  SL heel raise on shuttle 31# 2x10 on R Therapeutic Exercise:   Heel raise on step  SL heel raise on shuttle 37# 2x10 on R   Heel raise weight unloaded on step 12x   Toe extension stretching closed chain 4 x 20s   Inversion RTB 2 x 12 , slow ecc   Neuro Re-Ed:  Leukotape for inversion/posterior tibialis support  Neuro Re-Ed:  Tandem stance nods 10x   Tandem stance turns 10x ea   Leukotape for inversion/posterior tibialis support  Neuro Re-Ed:  Airex FSU/down 10x ea   Airex LSU/down 10x   40s x 2 tandem stance airex   Leukotape for inversion/posterior tibialis support  Neuro Re-Ed:  Airex FSU/down 10x2 ea   Airex LSU/down 10x  Tandem walks 3 laps    N euro Re-Ed:  Tandem walks 2 laps      Manual Therapy  Post talar glide in long sitting G4 with passive DF, knee ext and flexed  STM and trigger point release abd/add 3 and 4, 4 and 5  Great toe distraction , then combined with passive flexion   P-A cuneiform on navicular G2 up to G3 2 x 10, feels relief  Manual Therapy  Post talar glide in long sitting G4 with passive DF, knee ext and flexed  STM posterior tibialis  Great toe distraction, then combined with plantar flexion  Manual Therapy  Post talar glide in long sitting G4 with passive DF, knee ext   MWM posterior talar glide 2 x 10 on step  MFR and STM along posterior tibialis Manual Therapy(15')  Post talar glide in long sitting G4 with passive DF, knee ext  STM along posterior tibialis  Manual Therapy(15')  Post talar glide in long sitting G4 with passive DF, knee ext  STM along posterior tibialis   Subtalar mobs into inversion grade III-IV Manual Therapy  Post talar glide in long sitting G4 with passive DF, knee ext  STM along posterior tibialis   Subtalar mobs into inversion grade III-IV  Distraction TC    Vasopneumatic compression R ankle 34 dg, 10'             Charges: Manual x1 (15'); TE x 2 (28')  Total Timed Treatment: 43 min  Total Treatment Time: 43 min

## 2023-08-21 ENCOUNTER — OFFICE VISIT (OUTPATIENT)
Dept: PHYSICAL THERAPY | Facility: HOSPITAL | Age: 43
End: 2023-08-21
Attending: FAMILY MEDICINE
Payer: COMMERCIAL

## 2023-08-21 PROCEDURE — 97110 THERAPEUTIC EXERCISES: CPT

## 2023-08-21 PROCEDURE — 97140 MANUAL THERAPY 1/> REGIONS: CPT

## 2023-08-25 NOTE — PROGRESS NOTES
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 provider found  Date of Evaluation:    6/29/23    Precautions:  None Next MD visit:   none scheduled  Date of Surgery: n/a   Insurance Primary/Secondary: BCBS POS / N/A     # Auth Visits: 16 per POC           Subjective: Still gets pain on inside of foot and under arch. Requests soft tissue up in the muscle, feels that was helpful. Progressed to the assisted heel raises on stairs. Felt this in the calf. Contemplating making f/u appointment podiatry to pursue custom orthotics again. Went for a walk for the first time the other day. Pain:    3/10       Objective:   (8/28/2023)  Pain with resisted great toe flexion medial ankle/foot mild  Still pain with resisted inversion greater      (8/21/23)  Functional strength: unable to do SL heel raise, unable to lower SL from double le heel raise without height loss  *at eval: could not complete B heel raise d/t pain primarily top of foot     Palpation: TTP along posterior tibialis at distal insertion and lower mm belly     DF open chain knee ext R: grossly 3           Assessment: Did recommend patient make f/u with referring physician. Pain now much more isolated but progressing much more slowly at this point. Pain still with resisted inversion and resisted great toe flexion in DF at ankle. Feels relief with STM.      Patient was instructed in and issued a HEP for: standing gastrocnemius stretch 2 x 25s, 2x daily;  self MWM post talar glide 2 x 10  Inversion/eversion RTB 3 x 10 , slow ecc , heel raise 2 x 10-15; abd/add digits   Tandem head turns/nods          Goals:  (to be met in 16 visits)  Pt will demonstrate improved DF AROM to >= 10 degrees to promote proper foot clearance during gait and greater ease descending stairs without compensation (MET closed chain, upgraded to within 5 dg non-affected side)  Pt will increase plantar flexion strength B to be able to complete 5 SL heel raises for push off in gait (progressing)  Pt will report <2/10 pain with work and home activities such as walking kids camps at work (progressing, less regions of pain)  Pt will  be able to complete 6MWT with no increased pain. Pt will be independent and compliant with comprehensive HEP to maintain progress achieved in PT       Plan: discuss POC    Certification From: 2/67/8989  To:11/16/2023     Date: 7/25/2023  Tx#: 6/ 8/2/23  7/ 8/9/2023  8/ 8/15/2023  9/ 8/21/23  10/ 8/28/2023  11/   Therapeutic Exercise:    Inversion YTB 3 x 10  Eversion YTB 3 x 10   Heel raises B 2 x 15   Instructed in self great toe distraction and sustained with combined flexion painfree range    Therapeutic Exercise:   Calf wedge stretch 3 x 30s   RTB eversion 2 x 10  YTB inversion 3 x 15 Therapeutic Exercise:   RTB eversion 3 x 10  RTB inversion 3 x 10  Calf wedge stretch 3 x 30s   B heel raise with UL descent 8 reps ea , 2 sets  Therapeutic Exercise:   RTB eversion 3 x 10  RTB inversion 3 x 10  Great toe flexion on green sponge 2x10  SL heel raise on shuttle 31# 2x10 on R Therapeutic Exercise:   Heel raise on step  SL heel raise on shuttle 37# 2x10 on R   Heel raise weight unloaded on step 12x   Toe extension stretching closed chain 4 x 20s   Inversion RTB 2 x 12 , slow ecc Therapeutic Exercise:   SL heel raise on shuttle 43# 3x10 on R   AP RB 15x 2 no UE   ML RB 15x 2 no UE   2 x 30s gastroc wedge stretch  Re-assessment and recommend f/u physician   Neuro Re-Ed:  Tandem stance nods 10x   Tandem stance turns 10x ea   Leukotape for inversion/posterior tibialis support  Neuro Re-Ed:  Airex FSU/down 10x ea   Airex LSU/down 10x   40s x 2 tandem stance airex   Leukotape for inversion/posterior tibialis support  Neuro Re-Ed:  Airex FSU/down 10x2 ea   Airex LSU/down 10x  Tandem walks 3 laps    N euro Re-Ed:  Tandem walks 2 laps       Manual Therapy  Post talar glide in long sitting G4 with passive DF, knee ext and flexed  STM posterior tibialis  Great toe distraction, then combined with plantar flexion  Manual Therapy  Post talar glide in long sitting G4 with passive DF, knee ext   MWM posterior talar glide 2 x 10 on step  MFR and STM along posterior tibialis Manual Therapy(15')  Post talar glide in long sitting G4 with passive DF, knee ext  STM along posterior tibialis  Manual Therapy(15')  Post talar glide in long sitting G4 with passive DF, knee ext  STM along posterior tibialis   Subtalar mobs into inversion grade III-IV Manual Therapy  Post talar glide in long sitting G4 with passive DF, knee ext  STM along posterior tibialis   Subtalar mobs into inversion grade III-IV  Distraction TC  Manual Therapy  Post talar glide in long sitting G4 with passive DF, knee ext  STM along posterior tibialis   Subtalar mobs into inversion grade III-IV               Charges: Manual x1 (15'); TE x 2 (26')  Total Timed Treatment: 41 min  Total Treatment Time: 41 min

## 2023-08-28 ENCOUNTER — OFFICE VISIT (OUTPATIENT)
Dept: PHYSICAL THERAPY | Facility: HOSPITAL | Age: 43
End: 2023-08-28
Attending: FAMILY MEDICINE
Payer: COMMERCIAL

## 2023-08-28 PROCEDURE — 97110 THERAPEUTIC EXERCISES: CPT

## 2023-08-28 PROCEDURE — 97140 MANUAL THERAPY 1/> REGIONS: CPT

## 2023-09-01 ENCOUNTER — TELEPHONE (OUTPATIENT)
Dept: PHYSICAL THERAPY | Facility: HOSPITAL | Age: 43
End: 2023-09-01

## 2023-09-05 ENCOUNTER — OFFICE VISIT (OUTPATIENT)
Dept: PHYSICAL THERAPY | Facility: HOSPITAL | Age: 43
End: 2023-09-05
Attending: FAMILY MEDICINE
Payer: COMMERCIAL

## 2023-09-05 PROCEDURE — 97140 MANUAL THERAPY 1/> REGIONS: CPT

## 2023-09-05 PROCEDURE — 97110 THERAPEUTIC EXERCISES: CPT

## 2023-09-05 NOTE — PROGRESS NOTES
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 provider found  Date of Evaluation:    6/29/23    Precautions:  None Next MD visit:   none scheduled  Date of Surgery: n/a   Insurance Primary/Secondary: BCBS POS / N/A     # Auth Visits: 16 per POC           Subjective: \"doing pretty good\". Was pretty sore after last session, only for a day. Reports still some discomfort (behind malleoli). Reports still feels something there when doing the red. Wakes up everyday and some version of soreness present. But feels like this week has been much better. Did make physician appointment, couldn't get in until end of the month. Also interested in getting orthotics   Took some golf swings-not full power, not full. What agitates is the push off on the foot at the end. Pain:    2/10     Objective:   (8/28/2023)  Pain with resisted great toe flexion medial ankle/foot mild  Still pain with resisted inversion greater    (8/21/23)  Functional strength: unable to do SL heel raise, unable to lower SL from double le heel raise without height loss  *at eval: could not complete B heel raise d/t pain primarily top of foot     Palpation: TTP along posterior tibialis at distal insertion and lower mm belly     DF open chain knee ext R: grossly 3       Assessment: Reports continued progression with HEP. Feels able to put more weight through loaded leg with heel raise with contralateral foot on step. Discussed as able can progress into double leg heel raise with unilateral descent. Patient requested taping today. Will f/u in 2.5 weeks. Discussed time need for strengthening graded. Patient did also make f/u appointment as recommended. Added pivot to mimic golf swing in pain-free range AROM. Also added SLS with towel for inversion bias.  SLS without towel had some discomfort, with towel addition to mildly shorted mm medial ankle, pain free. Patient was instructed in and issued a HEP for: standing gastrocnemius stretch 2 x 25s, 2x daily; Inversion/eversion RTB 3 x 10 , slow ecc , heel raise 2 x 10-15;  SLS inversio bias  Pivot to mimic golf swing          Goals:  (to be met in 16 visits)  Pt will demonstrate improved DF AROM to >= 10 degrees to promote proper foot clearance during gait and greater ease descending stairs without compensation (MET closed chain, upgraded to within 5 dg non-affected side)  Pt will increase plantar flexion strength B to be able to complete 5 SL heel raises for push off in gait (progressing)  Pt will report <2/10 pain with work and home activities such as walking kids camps at work (progressing, less regions of pain)  Pt will  be able to complete 6MWT with no increased pain. Pt will be independent and compliant with comprehensive HEP to maintain progress achieved in PT       Plan: re-assess    Certification From: 5/15/5275  To:11/16/2023     Date: 7/25/2023  Tx#: 6/ 8/2/23  7/ 8/9/2023  8/ 8/15/2023  9/ 8/21/23  10/ 8/28/2023  11/ 9/5/2023  12/   Therapeutic Exercise:    Inversion YTB 3 x 10  Eversion YTB 3 x 10   Heel raises B 2 x 15   Instructed in self great toe distraction and sustained with combined flexion painfree range    Therapeutic Exercise:   Calf wedge stretch 3 x 30s   RTB eversion 2 x 10  YTB inversion 3 x 15 Therapeutic Exercise:   RTB eversion 3 x 10  RTB inversion 3 x 10  Calf wedge stretch 3 x 30s   B heel raise with UL descent 8 reps ea , 2 sets  Therapeutic Exercise:   RTB eversion 3 x 10  RTB inversion 3 x 10  Great toe flexion on green sponge 2x10  SL heel raise on shuttle 31# 2x10 on R Therapeutic Exercise:   Heel raise on step  SL heel raise on shuttle 37# 2x10 on R   Heel raise weight unloaded on step 12x   Toe extension stretching closed chain 4 x 20s   Inversion RTB 2 x 12 , slow ecc Therapeutic Exercise:   SL heel raise on shuttle 43# 3x10 on R   AP RB 15x 2 no UE   ML RB 15x 2 no UE   2 x 30s gastroc wedge stretch  Re-assessment and recommend f/u physician Therapeutic Exercise:   Mimic swing AROM within pain-free range  SL heel raise on shuttle 43# 3x10 on R   SLS 30s x 3 (towel inversion bias)  3 x 30s gastroc wedge stretch  2 x 30s tandem airex ea   Discussion HEP progression  Leukotape for inversion/posterior tibialis support    Neuro Re-Ed:  Tandem stance nods 10x   Tandem stance turns 10x ea   Leukotape for inversion/posterior tibialis support  Neuro Re-Ed:  Airex FSU/down 10x ea   Airex LSU/down 10x   40s x 2 tandem stance airex   Leukotape for inversion/posterior tibialis support  Neuro Re-Ed:  Airex FSU/down 10x2 ea   Airex LSU/down 10x  Tandem walks 3 laps    N euro Re-Ed:  Tandem walks 2 laps        Manual Therapy  Post talar glide in long sitting G4 with passive DF, knee ext and flexed  STM posterior tibialis  Great toe distraction, then combined with plantar flexion  Manual Therapy  Post talar glide in long sitting G4 with passive DF, knee ext   MWM posterior talar glide 2 x 10 on step  MFR and STM along posterior tibialis Manual Therapy(15')  Post talar glide in long sitting G4 with passive DF, knee ext  STM along posterior tibialis  Manual Therapy(15')  Post talar glide in long sitting G4 with passive DF, knee ext  STM along posterior tibialis   Subtalar mobs into inversion grade III-IV Manual Therapy  Post talar glide in long sitting G4 with passive DF, knee ext  STM along posterior tibialis   Subtalar mobs into inversion grade III-IV  Distraction TC  Manual Therapy  Post talar glide in long sitting G4 with passive DF, knee ext  STM along posterior tibialis   Subtalar mobs into inversion grade III-IV Manual Therapy  STM along posterior tibialis and MFR                Charges: Manual x1 (10'); TE x 2 (30')  Total Timed Treatment: 40 min  Total Treatment Time: 40 min

## 2023-09-11 ENCOUNTER — APPOINTMENT (OUTPATIENT)
Dept: PHYSICAL THERAPY | Facility: HOSPITAL | Age: 43
End: 2023-09-11
Attending: FAMILY MEDICINE
Payer: COMMERCIAL

## 2023-09-21 ENCOUNTER — TELEPHONE (OUTPATIENT)
Dept: PHYSICAL THERAPY | Facility: HOSPITAL | Age: 43
End: 2023-09-21

## 2023-09-21 NOTE — PROGRESS NOTES
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 provider found  Date of Evaluation:    6/29/23    Precautions:  None Next MD visit:   none scheduled  Date of Surgery: n/a   Insurance Primary/Secondary: BCBS POS / N/A     # Auth Visits: 16 per POC           Subjective: Doing \"pretty darn good\". Reports pain is more ache. Feels it is up more. Doing SLS, sometimes with wedge, sometimes without symptom dependent. Day or 2 where bugged for a while, but able to get calmed down. Has been mimicing golf swing, still feels some resistance end range of swing position. F/u with physician next week. Requests some manual like usual, especially higher. Feels able to put much more weight through R side for heel raises with L on step. Pain:    1.5/10    Objective:   (8/28/2023)  Pain with resisted great toe flexion medial ankle/foot mild  Still pain with resisted inversion greater    (8/21/23)  Functional strength: unable to do SL heel raise, unable to lower SL from double le heel raise without height loss  *at eval: could not complete B heel raise d/t pain primarily top of foot     Palpation: TTP along posterior tibialis at distal insertion and lower mm belly     DF open chain knee ext R: grossly 3       Assessment:  Continues to report functional improvement. Still with mild pain symptoms along posterior tibialis distribution. Ed on continued strengthening as tolerated. Requests STM/manual to mm so performed. Also revisited osiris A-P and P-A at ankle. Caviation noted with P-A ad then notable improved motion. Patient reports feeling more mobility once in standing as well. Continued uneven surface stability training with good tolerance without reported pain. DF range improved as well.  Vita Castro feels she is back to her normal amount of walking again, and soreness is still there but much reduced. Patient was instructed in and issued a HEP for: standing gastrocnemius stretch 2 x 25s, 2x daily;    Inversion/eversion RTB 3 x 10 , slow ecc , heel raise 2 x 10-15;  SLS inversio bias  Pivot to mimic golf swing          Goals:  (to be met in 16 visits)  Pt will demonstrate improved DF AROM to >= 10 degrees to promote proper foot clearance during gait and greater ease descending stairs without compensation (MET closed chain, upgraded to within 5 dg non-affected side)  Pt will increase plantar flexion strength B to be able to complete 5 SL heel raises for push off in gait (progressing)  Pt will report <2/10 pain with work and home activities such as walking kids camps at work (progressing, less regions of pain)  Pt will be independent and compliant with comprehensive HEP to maintain progress achieved in PT       Plan: re-assess; progress HEP as able    Certification From: 8/93/6859  To:11/16/2023     8/9/2023  8/ 8/15/2023  9/ 8/21/23  10/ 8/28/2023  11/ 9/5/2023  12/ 9/22/23  13/   Therapeutic Exercise:   RTB eversion 3 x 10  RTB inversion 3 x 10  Calf wedge stretch 3 x 30s   B heel raise with UL descent 8 reps ea , 2 sets  Therapeutic Exercise:   RTB eversion 3 x 10  RTB inversion 3 x 10  Great toe flexion on green sponge 2x10  SL heel raise on shuttle 31# 2x10 on R Therapeutic Exercise:   Heel raise on step  SL heel raise on shuttle 37# 2x10 on R   Heel raise weight unloaded on step 12x   Toe extension stretching closed chain 4 x 20s   Inversion RTB 2 x 12 , slow ecc Therapeutic Exercise:   SL heel raise on shuttle 43# 3x10 on R   AP RB 15x 2 no UE   ML RB 15x 2 no UE   2 x 30s gastroc wedge stretch  Re-assessment and recommend f/u physician Therapeutic Exercise:   Mimic swing AROM within pain-free range  SL heel raise on shuttle 43# 3x10 on R   SLS 30s x 3 (towel inversion bias)  3 x 30s gastroc wedge stretch  2 x 30s tandem airex ea   Discussion HEP progression  Leukotape for inversion/posterior tibialis support  Therapeutic Exercise:   SL heel raise shuttle-5 bands 2 x 15  Tandem airex forward/retro x 5 laps  Soleus wedge stretch 3 x 25s   Discussion POC/HEP   Neuro Re-Ed:  Airex FSU/down 10x2 ea   Airex LSU/down 10x  Tandem walks 3 laps    N euro Re-Ed:  Tandem walks 2 laps         Manual Therapy(15')  Post talar glide in long sitting G4 with passive DF, knee ext  STM along posterior tibialis  Manual Therapy(15')  Post talar glide in long sitting G4 with passive DF, knee ext  STM along posterior tibialis   Subtalar mobs into inversion grade III-IV Manual Therapy  Post talar glide in long sitting G4 with passive DF, knee ext  STM along posterior tibialis   Subtalar mobs into inversion grade III-IV  Distraction TC  Manual Therapy  Post talar glide in long sitting G4 with passive DF, knee ext  STM along posterior tibialis   Subtalar mobs into inversion grade III-IV Manual Therapy  STM along posterior tibialis and MFR Manual Therapy  G3 P-A and A-P ankle (cavitation with P-A and immediate relief from patient)  MFR along posterior tibialis distribution  STM along posterior tibialis distribution               Charges: Manual x2 (23' ); TE x 1 (14')  Total Timed Treatment: 37 min  Total Treatment Time: 37 min

## 2023-09-22 ENCOUNTER — OFFICE VISIT (OUTPATIENT)
Dept: PHYSICAL THERAPY | Facility: HOSPITAL | Age: 43
End: 2023-09-22
Attending: FAMILY MEDICINE
Payer: COMMERCIAL

## 2023-09-22 PROCEDURE — 97140 MANUAL THERAPY 1/> REGIONS: CPT

## 2023-09-22 PROCEDURE — 97110 THERAPEUTIC EXERCISES: CPT

## 2023-09-27 ENCOUNTER — OFFICE VISIT (OUTPATIENT)
Dept: PODIATRY CLINIC | Facility: CLINIC | Age: 43
End: 2023-09-27

## 2023-09-27 DIAGNOSIS — E66.01 MORBID OBESITY WITH BMI OF 45.0-49.9, ADULT (HCC): ICD-10-CM

## 2023-09-27 DIAGNOSIS — M79.671 RIGHT FOOT PAIN: ICD-10-CM

## 2023-09-27 DIAGNOSIS — M19.079 OSTEOARTHRITIS OF TALONAVICULAR JOINT: ICD-10-CM

## 2023-09-27 DIAGNOSIS — M25.571 ACUTE RIGHT ANKLE PAIN: ICD-10-CM

## 2023-09-27 DIAGNOSIS — M76.821 POSTERIOR TIBIAL TENDONITIS, RIGHT: Primary | ICD-10-CM

## 2023-09-27 DIAGNOSIS — M76.811 ANTERIOR TIBIAL TENDONITIS, RIGHT: ICD-10-CM

## 2023-09-27 PROCEDURE — 99213 OFFICE O/P EST LOW 20 MIN: CPT | Performed by: STUDENT IN AN ORGANIZED HEALTH CARE EDUCATION/TRAINING PROGRAM

## 2023-09-27 RX ORDER — METHYLPREDNISOLONE 4 MG/1
TABLET ORAL
Qty: 21 TABLET | Refills: 0 | Status: SHIPPED | OUTPATIENT
Start: 2023-09-27

## 2023-09-27 NOTE — PROGRESS NOTES
[facility], [facility]iatry  Progress Note    Saad Li is a 40year old female. Patient presents with: Foot Pain: R foot-  Pt states foot is achy. Denies any nubmness or tingling. HPI:     Patient is a pleasant 43-year-old female who presents to clinic for evaluation of pain to the top of her right foot/ankle as well as her posterior tibial tendon patient continues to ambulate with supportive shoes and has been performing physical therapy exercises well. She has noted some good improvement with physical therapy over the summer. She does feel that she is making good progress. .  Patient did experience some relief with cortisone injection at TN joint in the past.  She also found some relief with Medrol Dosepak. She is interested in possibly taking another round of steroids if possible. She is also interested in what other arch support options are available. No other complaints are mentioned. Allergies: Penicillins   Current Outpatient Medications   Medication Sig Dispense Refill    methylPREDNISolone 4 MG Oral Tablet Therapy Pack Take as prescribed on pack 21 tablet 0    methylPREDNISolone 4 MG Oral Tablet Therapy Pack Take as prescribed on pack 21 tablet 0    Saline Nasal Spray 0.65 % Nasal Solution 1 spray by Nasal route as needed for congestion. Cetirizine HCl (ZYRTEC ALLERGY OR) Take by mouth as needed. Fluticasone Propionate (FLONASE ALLERGY RELIEF NA) by Nasal route as needed. Past Medical History:   Diagnosis Date    Bronchitis       History reviewed. No pertinent surgical history. History reviewed. No pertinent family history.    Social History    Socioeconomic History      Marital status:     Tobacco Use      Smoking status: Never      Smokeless tobacco: Never    Vaping Use      Vaping Use: Never used    Substance and Sexual Activity      Alcohol use: Yes        Comment: occas      Drug use: Never          REVIEW OF SYSTEMS:     Today reviewed systems as documented below  GENERAL HEALTH: feels well otherwise  SKIN: denies any unusual skin lesions or rashes  RESPIRATORY: denies shortness of breath with exertion  CARDIOVASCULAR: denies chest pain on exertion  GI: denies abdominal pain and denies heartburn  NEURO: denies headaches  MUSCULO: denies arthritis, back pain      EXAM:   There were no vitals taken for this visit. GENERAL: well developed, well nourished, in no apparent distress  EXTREMITIES:   1. Integument: Normal skin temperature and turgor  2. Vascular: Dorsalis pedis two out of four bilateral and posterior tibial pulses two out of   four bilateral, capillary refill normal.   3. Musculoskeletal: All muscle groups are graded 5 out of 5 in the foot and ankle. Generalized pain on palpation of the anterior ankle joint of right lower extremity she is improved from prior visits. No pain noted to TN joint. Pain along posterior tibial tendon of right lower extremity. Compartments are soft and compressible. 4. Neurological: Normal sharp dull sensation; reflexes normal.    Right ankle MRI: 4/13/23: IMPRESSION:   1. Mild tendinosis of the Achilles tendon and posterior calcaneal heel spur at the Achilles tendon insertion. 2. Mild to moderate tendinosis of the retromalleolar and inframalleolar segments of the peroneus brevis tendon and mild peroneal tenosynovitis. 3. Mild tendinosis of the posterior tibial tendon adjacent to the navicular insertion. 4. The anterior tibial and extensor tendons are intact and normal in caliber and signal as specifically questioned. 5. Mild subchondral bone marrow edema in the plantar aspect of the talar neck and minimal subchondral bone marrow edema in the anterior aspect of the sustentaculum ciaran compatible with degenerative/reactive signal changes. Mild degenerative/reactive   changes at the talonavicular joint.  A corticated edematous ossicle along the dorsal margin of the talonavicular joint reflects either a chronic fracture fragment versus a normal variant ossicle with reactive edema. No evidence of an acute fracture. 6. Mild sprains of the anterior talofibular and calcaneofibular ligaments. Mild to moderate sprain of the superficial fibers of the deltoid ligament. 7. Mild plantar fasciitis involving the central band of the plantar fascia adjacent to the origin from the calcaneus, seen in association with a plantar calcaneal heel spur. 8. Diffuse fatty atrophy of the abductor digiti minimi muscle compatible with chronic denervation change. 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 ASSESSMENT AND PLAN:   Diagnoses and all orders for this visit:    Posterior tibial tendonitis, right  -     methylPREDNISolone 4 MG Oral Tablet Therapy Pack; Take as prescribed on pack    Anterior tibial tendonitis, right    Right foot pain    Acute right ankle pain    Osteoarthritis of talonavicular joint    Morbid obesity with BMI of 45.0-49.9, adult ([facility] [address]          Plan:     -Patient examined, chart history reviewed. -Patient is overall doing much better with physical therapy and wearing supportive shoes.  -She does have some posterior tibial tendinitis symptoms that continue to bother her.  -Discussed importance of supportive shoes and inserts. Can plan for orthotic casting once authorization is complete.  -Rx Medrol Dosepak to calm down inflammation.  -Should continue physical therapy as prescribed. -Can plan to follow-up for orthotic casting once authorization is complete or sooner if any other concerns arise.  -All questions were answered to satisfaction. The patient indicates understanding of these issues and agrees to the plan.     Ish Roa DPM

## 2023-10-09 ENCOUNTER — APPOINTMENT (OUTPATIENT)
Dept: PHYSICAL THERAPY | Age: 43
End: 2023-10-09
Attending: FAMILY MEDICINE
Payer: COMMERCIAL

## 2023-10-12 ENCOUNTER — TELEPHONE (OUTPATIENT)
Dept: PHYSICAL THERAPY | Facility: HOSPITAL | Age: 43
End: 2023-10-12

## 2023-10-12 ENCOUNTER — OFFICE VISIT (OUTPATIENT)
Dept: PODIATRY CLINIC | Facility: CLINIC | Age: 43
End: 2023-10-12
Payer: COMMERCIAL

## 2023-10-12 DIAGNOSIS — M76.821 POSTERIOR TIBIAL TENDONITIS, RIGHT: Primary | ICD-10-CM

## 2023-10-12 DIAGNOSIS — M79.671 RIGHT FOOT PAIN: ICD-10-CM

## 2023-10-12 NOTE — PROGRESS NOTES
[facility] Podiatry  Progress Note    Michel Quiros is a 40year old female. Patient presents with: Follow - Up: Orthotics casting         HPI:     Patient is a pleasant 81-year-old female who presents to clinic for follow-up of posterior tibial tendinitis and orthotic casting. She relates that her foot pain is doing fairly well. She does take a small break from formal physical therapy due to scheduling conflicts. She has not taken Medrol Dosepak yet. She has completed prior authorization is here for orthotic casting. No other complaints are mentioned. Allergies: Penicillins   Current Outpatient Medications   Medication Sig Dispense Refill    methylPREDNISolone 4 MG Oral Tablet Therapy Pack Take as prescribed on pack 21 tablet 0    methylPREDNISolone 4 MG Oral Tablet Therapy Pack Take as prescribed on pack 21 tablet 0    Saline Nasal Spray 0.65 % Nasal Solution 1 spray by Nasal route as needed for congestion. Cetirizine HCl (ZYRTEC ALLERGY OR) Take by mouth as needed. Fluticasone Propionate (FLONASE ALLERGY RELIEF NA) by Nasal route as needed. Past Medical History:   Diagnosis Date    Bronchitis       History reviewed. No pertinent surgical history. History reviewed. No pertinent family history.    Social History    Socioeconomic History      Marital status:     Tobacco Use      Smoking status: Never      Smokeless tobacco: Never    Vaping Use      Vaping Use: Never used    Substance and Sexual Activity      Alcohol use: Yes        Comment: occas      Drug use: Never          REVIEW OF SYSTEMS:     Today reviewed systems as documented below  GENERAL HEALTH: feels well otherwise  SKIN: denies any unusual skin lesions or rashes  RESPIRATORY: denies shortness of breath with exertion  CARDIOVASCULAR: denies chest pain on exertion  GI: denies abdominal pain and denies heartburn  NEURO: denies headaches  MUSCULO: denies arthritis, back pain      EXAM:   There were no vitals taken for this visit. GENERAL: well developed, well nourished, in no apparent distress  EXTREMITIES:   1. Integument: Normal skin temperature and turgor  2. Vascular: Dorsalis pedis two out of four bilateral and posterior tibial pulses two out of   four bilateral, capillary refill normal.   3. Musculoskeletal: All muscle groups are graded 5 out of 5 in the foot and ankle. Generalized pain on palpation of the anterior ankle joint of right lower extremity she is improved from prior visits. No pain noted to TN joint. Pain along posterior tibial tendon of right lower extremity. Compartments are soft and compressible. 4. Neurological: Normal sharp dull sensation; reflexes normal.    Right ankle MRI: 4/13/23: IMPRESSION:   1. Mild tendinosis of the Achilles tendon and posterior calcaneal heel spur at the Achilles tendon insertion. 2. Mild to moderate tendinosis of the retromalleolar and inframalleolar segments of the peroneus brevis tendon and mild peroneal tenosynovitis. 3. Mild tendinosis of the posterior tibial tendon adjacent to the navicular insertion. 4. The anterior tibial and extensor tendons are intact and normal in caliber and signal as specifically questioned. 5. Mild subchondral bone marrow edema in the plantar aspect of the talar neck and minimal subchondral bone marrow edema in the anterior aspect of the sustentaculum ciaran compatible with degenerative/reactive signal changes. Mild degenerative/reactive   changes at the talonavicular joint. A corticated edematous ossicle along the dorsal margin of the talonavicular joint reflects either a chronic fracture fragment versus a normal variant ossicle with reactive edema. No evidence of an acute fracture. 6. Mild sprains of the anterior talofibular and calcaneofibular ligaments. Mild to moderate sprain of the superficial fibers of the deltoid ligament.      7. Mild plantar fasciitis involving the central band of the plantar fascia adjacent to the origin from the calcaneus, seen in association with a plantar calcaneal heel spur. 8. Diffuse fatty atrophy of the abductor digiti minimi muscle compatible with chronic denervation change. 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 ASSESSMENT AND PLAN:   Diagnoses and all orders for this visit:    Posterior tibial tendonitis, right    Right foot pain            Plan:     -Patient examined, chart history reviewed. -Patient is overall doing much better with physical therapy and wearing supportive shoes.  -She has completed prior authorization and is here for orthotic casting. Patient casted for custom inserts with plaster casting per standard technique, keeping foot in neutral position.  -We will call patient when orthotics are ready for pickup.  -Patient can continue physical therapy and anti-inflammatories to help with posterior tibial tendinitis.  -All questions were answered to satisfaction. The patient indicates understanding of these issues and agrees to the plan.     Sierra Gonzalez DPM

## 2023-10-16 ENCOUNTER — OFFICE VISIT (OUTPATIENT)
Dept: PHYSICAL THERAPY | Facility: HOSPITAL | Age: 43
End: 2023-10-16
Attending: FAMILY MEDICINE
Payer: COMMERCIAL

## 2023-10-16 PROCEDURE — 97140 MANUAL THERAPY 1/> REGIONS: CPT

## 2023-10-16 PROCEDURE — 97110 THERAPEUTIC EXERCISES: CPT

## 2023-10-16 NOTE — PROGRESS NOTES
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 provider found  Date of Evaluation:    6/29/23    Precautions:  None Next MD visit:   none scheduled  Date of Surgery: n/a   Insurance Primary/Secondary: BCBS POS / N/A     # Auth Visits: 16 per POC           Subjective: The patient reports that she is doing pretty well. She has aches and pains. She reports that she did a lot of walking yesterday, the bottom of the foot feels good. The posterior to anterior ankle glide was very helpful. She was just fit for orthotics, it will be 3-4 weeks. She still gets achiness on the medial ankle, would like to try the tape again. Pain:    2/10 with walking    Objective:   (10/16/2023)  DF open chain: 5  Palpation: tenderness/pain reproduced with palpation to musculature on dorsal aspect of foot between 3rd and 4th metatarsal    (8/28/2023)  Pain with resisted great toe flexion medial ankle/foot mild  Still pain with resisted inversion greater    (8/21/23)  Functional strength: unable to do SL heel raise, unable to lower SL from double le heel raise without height loss  *at eval: could not complete B heel raise d/t pain primarily top of foot     Palpation: TTP along posterior tibialis at distal insertion and lower mm belly     DF open chain knee ext R: grossly 3      Assessment:  The patient had some improvements in her dorsiflexion range of motion since last measurement. She does report some overall steady improvements in her symptoms. She was unable to progress to the single leg heel raise due to midfoot pain, which was reproduced with palpation to the musculature between 3rd and 4th metatarsal. Instructed the patient in how to perform self posterior tibial glide with her black theraband at home.      Patient was instructed in and issued a HEP for: standing gastrocnemius stretch 2 x 25s, 2x daily;    Inversion/eversion RTB 3 x 10 , slow ecc , heel raise 2 x 10-15;  SLS inversio bias  Pivot to mimic golf swing          Goals:  (to be met in 16 visits)  Pt will demonstrate improved DF AROM to >= 10 degrees to promote proper foot clearance during gait and greater ease descending stairs without compensation (MET closed chain, upgraded to within 5 dg non-affected side)  Pt will increase plantar flexion strength B to be able to complete 5 SL heel raises for push off in gait (progressing)  Pt will report <2/10 pain with work and home activities such as walking kids camps at work (progressing, less regions of pain)  Pt will be independent and compliant with comprehensive HEP to maintain progress achieved in PT       Plan: re-assess; progress HEP as able    Certification From: 6/42/9794  To:11/16/2023     8/21/23  10/ 8/28/2023  11/ 9/5/2023  12/ 9/22/23  13/ 10/16/23  14/   Therapeutic Exercise:   Heel raise on step  SL heel raise on shuttle 37# 2x10 on R   Heel raise weight unloaded on step 12x   Toe extension stretching closed chain 4 x 20s   Inversion RTB 2 x 12 , slow ecc Therapeutic Exercise:   SL heel raise on shuttle 43# 3x10 on R   AP RB 15x 2 no UE   ML RB 15x 2 no UE   2 x 30s gastroc wedge stretch  Re-assessment and recommend f/u physician Therapeutic Exercise:   Mimic swing AROM within pain-free range  SL heel raise on shuttle 43# 3x10 on R   SLS 30s x 3 (towel inversion bias)  3 x 30s gastroc wedge stretch  2 x 30s tandem airex ea   Discussion HEP progression  Leukotape for inversion/posterior tibialis support  Therapeutic Exercise:   SL heel raise shuttle-5 bands 2 x 15  Tandem airex forward/retro x 5 laps  Soleus wedge stretch 3 x 25s   Discussion POC/HEP Therapeutic Exercise:   Tried SL heel raise with UE support, DC pt unable  DL heel raise with SL lower 5x, DC due to pain  Practiced DF stretch with black theraband around distal tibia 10x  Toe abduction 10x            Manual Therapy  Post talar glide in long sitting G4 with passive DF, knee ext  STM along posterior tibialis   Subtalar mobs into inversion grade III-IV  Distraction TC  Manual Therapy  Post talar glide in long sitting G4 with passive DF, knee ext  STM along posterior tibialis   Subtalar mobs into inversion grade III-IV Manual Therapy  STM along posterior tibialis and MFR Manual Therapy  G3 P-A and A-P ankle (cavitation with P-A and immediate relief from patient)  MFR along posterior tibialis distribution  STM along posterior tibialis distribution Manual Therapy  G3 P-A and A-P ankle  STM to dorsum of foot between 3rd and 4th metatarsal   Tape to posterior tibialis              Charges: Manual x2 (24' ); TE x 1 (16')  Total Timed Treatment: 40 min  Total Treatment Time: 40 min

## 2023-11-10 ENCOUNTER — PATIENT MESSAGE (OUTPATIENT)
Dept: PODIATRY CLINIC | Facility: CLINIC | Age: 43
End: 2023-11-10

## 2023-11-13 ENCOUNTER — APPOINTMENT (OUTPATIENT)
Dept: GENERAL RADIOLOGY | Age: 43
End: 2023-11-13
Attending: NURSE PRACTITIONER
Payer: OTHER MISCELLANEOUS

## 2023-11-13 ENCOUNTER — HOSPITAL ENCOUNTER (OUTPATIENT)
Age: 43
Discharge: HOME | End: 2023-11-13
Payer: OTHER MISCELLANEOUS

## 2023-11-13 VITALS
BODY MASS INDEX: 41.95 KG/M2 | RESPIRATION RATE: 16 BRPM | OXYGEN SATURATION: 98 % | HEIGHT: 70 IN | TEMPERATURE: 97 F | HEART RATE: 84 BPM | DIASTOLIC BLOOD PRESSURE: 103 MMHG | SYSTOLIC BLOOD PRESSURE: 170 MMHG | WEIGHT: 293 LBS

## 2023-11-13 DIAGNOSIS — S99.922A TOE TRAUMA, LEFT, INITIAL ENCOUNTER: ICD-10-CM

## 2023-11-13 DIAGNOSIS — S92.425A NONDISPLACED FRACTURE OF DISTAL PHALANX OF LEFT GREAT TOE, INITIAL ENCOUNTER FOR CLOSED FRACTURE: Primary | ICD-10-CM

## 2023-11-13 PROCEDURE — 99213 OFFICE O/P EST LOW 20 MIN: CPT | Performed by: NURSE PRACTITIONER

## 2023-11-13 PROCEDURE — 73660 X-RAY EXAM OF TOE(S): CPT | Performed by: NURSE PRACTITIONER

## 2023-11-13 PROCEDURE — L3260 AMBULATORY SURGICAL BOOT EAC: HCPCS | Performed by: NURSE PRACTITIONER

## 2023-11-13 NOTE — TELEPHONE ENCOUNTER
From: Larisa Nunez  To: Samuel Anguiano  Sent: 11/10/2023 1:09 PM CST  Subject: Angie Close Dr. King Ortiz,  I know your schedule is pretty full so I wanted to send a picture and get your thoughts. See attached photo. On Tuesday afternoon I managed to have a 50ish pound box of frozen turkeys fall on my big toe. Been icing, taking advil and elevating when I can. I can move the toe with no issue from the main joint. Other than the ugliness, the main issue right now is the underside of the toe(pad). Can't put much pressure on it without fear of \"popping what feels like a bubble\". Wanted to keep your thoughts to treat or see if I should come in at all. Thanks a bunch. Lilia flowers.  \"Turkey Toe\"

## 2023-11-13 NOTE — TELEPHONE ENCOUNTER
07/11/2018  Yomaira Roblero is a 39 y.o., female.    Anesthesia Evaluation    I have reviewed the Patient Summary Reports.    I have reviewed the Nursing Notes.   I have reviewed the Medications.     Review of Systems  Anesthesia Hx:  No previous Anesthesia  History of prior surgery of interest to airway management or planning: Previous anesthesia: General T ansd A with general anesthesia.  Denies Family Hx of Anesthesia complications.   Denies Personal Hx of Anesthesia complications.   Social:  Non-Smoker    Hematology/Oncology:  Hematology Normal   Oncology Normal     EENT/Dental:EENT/Dental Normal   Cardiovascular:  Cardiovascular Normal     Pulmonary:  Pulmonary Normal    Renal/:  Renal/ Normal     Hepatic/GI:  Hepatic/GI Normal    Musculoskeletal:  Musculoskeletal Normal    Neurological:  Neurology Normal    Endocrine:  Endocrine Normal    Dermatological:  Skin Normal    Psych:  Psychiatric Normal           Physical Exam  General:  Well nourished    Airway/Jaw/Neck:  Airway Findings: Mouth Opening: Normal Tongue: Normal  General Airway Assessment: Adult  Mallampati: I      Dental:  Dental Findings: Molar caps        Mental Status:  Mental Status Findings:  Cooperative         Anesthesia Plan  Type of Anesthesia, risks & benefits discussed:  Anesthesia Type:  general  Patient's Preference:   Intra-op Monitoring Plan: standard ASA monitors  Intra-op Monitoring Plan Comments:   Post Op Pain Control Plan: per primary service following discharge from PACU and multimodal analgesia  Post Op Pain Control Plan Comments:   Induction:   IV  Beta Blocker:         Informed Consent: Patient understands risks and agrees with Anesthesia plan.  Questions answered. Anesthesia consent signed with patient.  ASA Score: 1     Day of Surgery Review of History & Physical:    H&P update referred to the surgeon.    Called pt 2 times TCB       Ready For Surgery From Anesthesia Perspective.

## 2023-11-13 NOTE — DISCHARGE INSTRUCTIONS
Wear post-op shoe whenever standing and/or walking  Ice as tolerated  Elevate as much as possible  Follow up with Podiatry

## 2023-11-13 NOTE — ED INITIAL ASSESSMENT (HPI)
Pt c/o pain, swelling and injury to her great toe of her left foot. Pt states the injury occurred 1 week ago.

## 2023-11-14 NOTE — TELEPHONE ENCOUNTER
S/w pt and offered appt on 11/17 with Dr Jose C Niño but she declined due to other commitments. Found an opening on 11/16 with Dr Meg Amaya at [address] [place] [address] at [facility] and pt accepted appt.

## 2023-11-16 ENCOUNTER — OFFICE VISIT (OUTPATIENT)
Dept: PODIATRY CLINIC | Facility: CLINIC | Age: 43
End: 2023-11-16
Payer: OTHER MISCELLANEOUS

## 2023-11-16 DIAGNOSIS — S92.425A CLOSED NONDISPLACED FRACTURE OF DISTAL PHALANX OF LEFT GREAT TOE, INITIAL ENCOUNTER: Primary | ICD-10-CM

## 2023-11-16 PROCEDURE — 99213 OFFICE O/P EST LOW 20 MIN: CPT | Performed by: STUDENT IN AN ORGANIZED HEALTH CARE EDUCATION/TRAINING PROGRAM

## 2023-11-16 NOTE — PROGRESS NOTES
[facility], [place], [facility] Podiatry  Progress Note      Manav Wilson is a 40year old female. Chief Complaint   Patient presents with    Fracture     Consult  left great toe fx. Pain 3/10, taking  Advil to manage pain, no numbness or tingling. Visit to [facility] 11/13/23 with  XR. Wearing surgical shoe. On 11/07/23 A box of 15 lb of turkey fell on Left foot. HPI:     Patient is a pleasant 20-year-old female who presents to clinic today for evaluation of left great toe fracture. Patient states she dropped a box of frozen turkey's on her left foot on November 7. Patient was evaluated in the emergency room where left foot x-rays were obtained showing a distal left phalanx fracture. She was placed in a surgical shoe. Taking Advil for pain. Allergies: Penicillins    Current Outpatient Medications   Medication Sig Dispense Refill    Saline Nasal Spray 0.65 % Nasal Solution 1 spray by Nasal route as needed for congestion. Cetirizine HCl (ZYRTEC ALLERGY OR) Take by mouth as needed. Fluticasone Propionate (FLONASE ALLERGY RELIEF NA) by Nasal route as needed. methylPREDNISolone 4 MG Oral Tablet Therapy Pack Take as prescribed on pack (Patient not taking: Reported on 11/16/2023) 21 tablet 0    methylPREDNISolone 4 MG Oral Tablet Therapy Pack Take as prescribed on pack (Patient not taking: Reported on 11/16/2023) 21 tablet 0      Past Medical History:   Diagnosis Date    Bronchitis       History reviewed. No pertinent surgical history. History reviewed. No pertinent family history.    Social History     Socioeconomic History    Marital status:    Tobacco Use    Smoking status: Never    Smokeless tobacco: Never   Vaping Use    Vaping Use: Never used   Substance and Sexual Activity    Alcohol use: Yes     Comment: occas    Drug use: Never           REVIEW OF SYSTEMS:     Denies nause, fever, chills  No calf pain  Denies chest pain or SOB      EXAM:   LMP 10/14/2023   GENERAL: well developed, well nourished, in no apparent distress  EXTREMITIES:   1. Integument: Normal skin temperature and turgor. No open lesions noted to entire left foot  2. Vascular: Dorsalis pedis two out of four bilateral and posterior tibial pulses two out of   four bilateral, capillary refill normal.  Mild ecchymosis noted to the proximal left hallux nail fold   3. Musculoskeletal:  tenderness with palpation to left hallux distal phalanx    4. Neurological: Normal sharp dull sensation; reflexes normal.      XR TOE(S) (MIN 2 VIEWS), LEFT 1ST (CPT=73660)    Result Date: 11/13/2023  CONCLUSION:  1. Nondisplaced 1st distal phalangeal tuft fracture. LOCATION:  [facility]   Dictated by (CST): Ra Villaseñor MD on 11/13/2023 at 1:36 PM     Finalized by (CST): Ra Villaseñor MD on 11/13/2023 at 1:37 PM         ASSESSMENT AND PLAN:   Diagnoses and all orders for this visit:    Closed nondisplaced fracture of distal phalanx of left great toe, initial encounter        Plan:     Evaluated the patient and discussed treatment options. Reviewed the patients x-rays with the patient. Fracture care and treatment plan discussed. Discussed the importance of immobilization. Discussed conservative management   Will move forward with conservative  management. Wrapped left great toe with coban and protected with tube foam for extra padding  Continue wraps for 2-3 days. Use tube foam for extra padding  Don't walk barefoot  Avoid high impact Lower extremity activities  RTC in 3 weeks for re-evaluation        The patient indicates understanding of these issues and agrees to the plan.         Emmett Katz DPM

## 2023-11-17 ENCOUNTER — OFFICE VISIT (OUTPATIENT)
Dept: PHYSICAL THERAPY | Facility: HOSPITAL | Age: 43
End: 2023-11-17
Attending: FAMILY MEDICINE
Payer: COMMERCIAL

## 2023-11-17 ENCOUNTER — TELEPHONE (OUTPATIENT)
Dept: PHYSICAL THERAPY | Facility: HOSPITAL | Age: 43
End: 2023-11-17

## 2023-11-17 PROCEDURE — 97140 MANUAL THERAPY 1/> REGIONS: CPT

## 2023-11-17 PROCEDURE — 97110 THERAPEUTIC EXERCISES: CPT

## 2023-11-17 NOTE — PROGRESS NOTES
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 Allie Frederick Date of Evaluation:    6/29/23    Precautions:  None Next MD visit:   none scheduled  Date of Surgery: n/a   Insurance Primary/Secondary: BCBS POS / N/A     # Auth Visits: 16 per POC           Subjective: The patient reports that since the last session with trying the heel raies it has been tender on the top of the foot. The medial ankle has been feeling pretty good, but still has soreness as it comes through the ankle and into the lower leg. If she walks with purpose it activates it. This happens within 15 minutes. She denies back pain. The patient reports that she dropped a 50# box of turkeys on her left great toe, and she has a fracture. Pain:    2-3/10 with walking    Objective:     11/17/23  Pain with active dorsiflexion (pulling medial ankle)  Hypomobility with lateral subtalar glide and great toe extension  Pt is able to perform 6 repetitions of a SL heel raise  Lumbar PA assessment: local pain at L4, L5 CPA and UPA CJ, this does not reproduce foot pain. Medial/cuadally directed UPA on R did not reproduce foot pain.   DF: 5 with knee extended    (8/28/2023)  Pain with resisted great toe flexion medial ankle/foot mild  Still pain with resisted inversion greater    (8/21/23)  Functional strength: unable to do SL heel raise, unable to lower SL from double le heel raise without height loss  *at eval: could not complete B heel raise d/t pain primarily top of foot     Palpation: TTP along posterior tibialis at distal insertion and lower mm belly     DF open chain knee ext R: grossly 3    Hip Knee Foot/Ankle   Flexion: R 5/5; L 5/5  Abduction: R 4+/5; L 4+/5  ER: R 4+/5; L 4+/5  IR: R 4+/5; L 5/5 Flexion: R 5/5; L 5/5 (screened sitting only)  Extension: R 5/5; L 5/5    DF: R 5/5; L 5/5  PF:   R 6; L unable to test due to fracture  INV: R 5/5; L 5/5  EV: R 5/5; L 5/5         Assessment:  The patient has some continued medial foot pain which is gradually improving. Today she was able to perform a SL heel raise, which she was not able to perform before. Encouraged the patient to progress to performing the SL heel raise without support from her other foot, starting with low repetitions. Added a self stretch into hindfoot eversion for home. Patient was instructed in and issued a HEP for: standing gastrocnemius stretch 2 x 25s, 2x daily;    Inversion/eversion RTB 3 x 10 , slow ecc , heel raise 2 x 10-15;  SLS inversio bias  Pivot to mimic golf swing          Goals:  (to be met in 16 visits)  Pt will demonstrate improved DF AROM to >= 10 degrees to promote proper foot clearance during gait and greater ease descending stairs without compensation (MET closed chain, upgraded to within 5 dg non-affected side)  Pt will increase plantar flexion strength B to be able to complete 5 SL heel raises for push off in gait (progressing)  Pt will report <2/10 pain with work and home activities such as walking kids camps at work (progressing, less regions of pain)  Pt will be independent and compliant with comprehensive HEP to maintain progress achieved in PT       Plan: re-assess; progress HEP as able    Certification From: 6/70/0534  To:11/16/2023     8/28/2023  11/ 9/5/2023  12/ 9/22/23  13/ 10/16/23  14/16 11/17/23  15/16   Therapeutic Exercise:   SL heel raise on shuttle 43# 3x10 on R   AP RB 15x 2 no UE   ML RB 15x 2 no UE   2 x 30s gastroc wedge stretch  Re-assessment and recommend f/u physician Therapeutic Exercise:   Mimic swing AROM within pain-free range  SL heel raise on shuttle 43# 3x10 on R   SLS 30s x 3 (towel inversion bias)  3 x 30s gastroc wedge stretch  2 x 30s tandem airex ea   Discussion HEP progression  Leukotape for inversion/posterior tibialis support  Therapeutic Exercise:   SL heel raise shuttle-5 bands 2 x 15  Tandem airex forward/retro x 5 laps  Soleus wedge stretch 3 x 25s   Discussion POC/HEP Therapeutic Exercise:   Tried SL heel raise with UE support, DC pt unable  DL heel raise with SL lower 5x, DC due to pain  Practiced DF stretch with black theraband around distal tibia 10x  Toe abduction 10x   Therapeutic Exercise:   Reassessment of strength and ROM measures  SL heel raise 2 sets of 4-5 reps  Self stretch into rearfoot eversion          Manual Therapy  Post talar glide in long sitting G4 with passive DF, knee ext  STM along posterior tibialis   Subtalar mobs into inversion grade III-IV Manual Therapy  STM along posterior tibialis and MFR Manual Therapy  G3 P-A and A-P ankle (cavitation with P-A and immediate relief from patient)  MFR along posterior tibialis distribution  STM along posterior tibialis distribution Manual Therapy  G3 P-A and A-P ankle  STM to dorsum of foot between 3rd and 4th metatarsal   Tape to posterior tibialis Manual Therapy  PAIVM assessment of lumbar spine mobility  Foot/ankle physiologic and accessory glide assessment  Subtalar medial and lateral glide grade III in prone  X30 min              Charges: Manual x2 (30' ); TE x 1 (14')  Total Timed Treatment: 44 min  Total Treatment Time: 44 min

## 2023-11-27 ENCOUNTER — OFFICE VISIT (OUTPATIENT)
Dept: PHYSICAL THERAPY | Facility: HOSPITAL | Age: 43
End: 2023-11-27
Attending: FAMILY MEDICINE
Payer: COMMERCIAL

## 2023-11-27 PROCEDURE — 97140 MANUAL THERAPY 1/> REGIONS: CPT

## 2023-11-27 PROCEDURE — 97110 THERAPEUTIC EXERCISES: CPT

## 2023-11-27 NOTE — PROGRESS NOTES
Progress Summary  Pt has attended 16 visits in Physical Therapy. 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 Margie Bellamy Date of Evaluation:    6/29/23    Precautions:  None Next MD visit:   none scheduled  Date of Surgery: n/a   Insurance Primary/Secondary: BCBS POS / N/A     # Auth Visits: 20 per POC           Subjective: The patient states that her foot is going ok, it is achy on the inside of the ankle. She feels it if she pushes off with the big toe or if she brings the big toe up. She tried the single leg heel raises, she switched back and forth between the single and the assisted. Pain:    2-3/10 with walking updated 11/27    Objective:     11/27/23: pain/pulling medial ankle with active dorsiflexion  DF: 5 with knee extended  Pain reproduced with resisted great toe flexion, palpation along flexor hallucis longus    11/17/23  Pain with active dorsiflexion (pulling medial ankle)  Hypomobility with lateral subtalar glide and great toe extension  Pt is able to perform 6 repetitions of a SL heel raise  Lumbar PA assessment: local pain at L4, L5 CPA and UPA CJ, this does not reproduce foot pain. Medial/cuadally directed UPA on R did not reproduce foot pain.   DF: 5 with knee extended    (8/28/2023)  Pain with resisted great toe flexion medial ankle/foot mild  Still pain with resisted inversion greater    Palpation: TTP along posterior tibialis at distal insertion and lower mm belly     DF open chain knee ext R: grossly 3    Hip Knee Foot/Ankle   Flexion: R 5/5; L 5/5  Abduction: R 4+/5; L 4+/5  ER: R 4+/5; L 4+/5  IR: R 4+/5; L 5/5 Flexion: R 5/5; L 5/5 (screened sitting only)  Extension: R 5/5; L 5/5    DF: R 5/5; L 5/5  PF:   R 6; L unable to test due to fracture  INV: R 5/5; L 5/5  EV: R 5/5; L 5/5         Assessment:  The patient continues to wear a cast boot on her opposite foot due to great toe fracture. Reproduced her medial ankle pain with resisted great toe flexion. Added an isometric great toe flexion and self soft tissue mobilization to address this. The patient had improvements in her pain with active dorsiflexion following soft tissue mobilization to the flexor hallucis longus. Patient was instructed in and issued a HEP for: standing gastrocnemius stretch 2 x 25s, 2x daily; Inversion/eversion RTB 3 x 10 , slow ecc , heel raise 2 x 10-15;  SLS inversio bias  Pivot to mimic golf swing   Great toe flexion on sponge  Self STM to flexor hallucis longus         Goals:  (to be met in 20 visits)  Pt will demonstrate improved DF AROM to >= 10 degrees to promote proper foot clearance during gait and greater ease descending stairs without compensation (MET closed chain, upgraded to within 5 dg non-affected side)  Pt will increase plantar flexion strength B to be able to complete 5 SL heel raises for push off in gait (MET)  Pt will report <2/10 pain with work and home activities such as walking kids camps at work (progressing, less regions of pain)  Pt will be independent and compliant with comprehensive HEP to maintain progress achieved in PT (progressing)       Plan: Continue skilled Physical Therapy 1 x/week or a total of 4 visits over a 90 day period. Treatment will include: manual therapy, therapeutic exercise, neuromuscular re-education, gait training, therapeutic activities       Patient/Family/Caregiver was advised of these findings, precautions, and treatment options and has agreed to actively participate in planning and for this course of care. Thank you for your referral. If you have any questions, please contact me at Dept: [phone].     Sincerely,  Electronically signed by therapist: Don Cox, PT       Certification From: 52/71/2748  To:2/26/2024       9/5/2023  12/ 9/22/23  13/ 10/16/23  14/16 11/17/23  15/16 11/27/23  16/16   Therapeutic Exercise:   Mimic swing AROM within pain-free range  SL heel raise on shuttle 43# 3x10 on R   SLS 30s x 3 (towel inversion bias)  3 x 30s gastroc wedge stretch  2 x 30s tandem airex ea   Discussion HEP progression  Leukotape for inversion/posterior tibialis support  Therapeutic Exercise:   SL heel raise shuttle-5 bands 2 x 15  Tandem airex forward/retro x 5 laps  Soleus wedge stretch 3 x 25s   Discussion POC/HEP Therapeutic Exercise:   Tried SL heel raise with UE support, DC pt unable  DL heel raise with SL lower 5x, DC due to pain  Practiced DF stretch with black theraband around distal tibia 10x  Toe abduction 10x   Therapeutic Exercise:   Reassessment of strength and ROM measures  SL heel raise 2 sets of 4-5 reps  Self stretch into rearfoot eversion Therapeutic Exercise:   Great toe flexion into manual resistance 10 sec hold 3x, then on sponge 10x.    Great toe extension stretch in sitting, 5x10 sec            Manual Therapy  STM along posterior tibialis and MFR Manual Therapy  G3 P-A and A-P ankle (cavitation with P-A and immediate relief from patient)  MFR along posterior tibialis distribution  STM along posterior tibialis distribution Manual Therapy  G3 P-A and A-P ankle  STM to dorsum of foot between 3rd and 4th metatarsal   Tape to posterior tibialis Manual Therapy  PAIVM assessment of lumbar spine mobility  Foot/ankle physiologic and accessory glide assessment  Subtalar medial and lateral glide grade III in prone  X30 min Manual Therapy  Great toe flexion/extension passive stretching  Great toe dorsal accessory glides grade IV  STM to flexor hallucis longus for pain relief, taught pt self STM  X20 min              Charges: Manual x1 (20' ); TE x 2 (25')  Total Timed Treatment: 44 min  Total Treatment Time: 44 min

## 2023-11-28 ENCOUNTER — TELEPHONE (OUTPATIENT)
Dept: PODIATRY CLINIC | Facility: CLINIC | Age: 43
End: 2023-11-28

## 2023-12-07 ENCOUNTER — HOSPITAL ENCOUNTER (OUTPATIENT)
Dept: GENERAL RADIOLOGY | Facility: HOSPITAL | Age: 43
Discharge: HOME | End: 2023-12-07
Attending: STUDENT IN AN ORGANIZED HEALTH CARE EDUCATION/TRAINING PROGRAM
Payer: COMMERCIAL

## 2023-12-07 ENCOUNTER — OFFICE VISIT (OUTPATIENT)
Dept: PODIATRY CLINIC | Facility: CLINIC | Age: 43
End: 2023-12-07

## 2023-12-07 DIAGNOSIS — T14.8XXA FRACTURE: Primary | ICD-10-CM

## 2023-12-07 DIAGNOSIS — T14.8XXA FRACTURE: ICD-10-CM

## 2023-12-07 PROCEDURE — 73630 X-RAY EXAM OF FOOT: CPT | Performed by: STUDENT IN AN ORGANIZED HEALTH CARE EDUCATION/TRAINING PROGRAM

## 2023-12-07 PROCEDURE — 99213 OFFICE O/P EST LOW 20 MIN: CPT | Performed by: STUDENT IN AN ORGANIZED HEALTH CARE EDUCATION/TRAINING PROGRAM

## 2023-12-07 NOTE — PROGRESS NOTES
[facility], [facility] Podiatry  Progress Note      Angel Joe is a 44year old female. Chief Complaint   Patient presents with    Fracture     F/u Left great toe Fx- pain 2/10. Wearing surgical shoe. HPI:     Patient presents for follow-up of left great toe fracture. She has been wearing the surgical shoe. Admits to some walking around the house without the surgical shoe with no great amount of pain noted. She still admits to some tenderness at the medial aspect of the left hallux. Allergies: Penicillins    Current Outpatient Medications   Medication Sig Dispense Refill    Saline Nasal Spray 0.65 % Nasal Solution 1 spray by Nasal route as needed for congestion. Cetirizine HCl (ZYRTEC ALLERGY OR) Take by mouth as needed. Fluticasone Propionate (FLONASE ALLERGY RELIEF NA) by Nasal route as needed. Past Medical History:   Diagnosis Date    Bronchitis       No past surgical history on file. No family history on file. Social History     Socioeconomic History    Marital status:    Tobacco Use    Smoking status: Never    Smokeless tobacco: Never   Vaping Use    Vaping Use: Never used   Substance and Sexual Activity    Alcohol use: Yes     Comment: occas    Drug use: Never           REVIEW OF SYSTEMS:     Denies nause, fever, chills  No calf pain  Denies chest pain or SOB      EXAM:   LMP 10/14/2023   GENERAL: well developed, well nourished, in no apparent distress  EXTREMITIES:   1. Integument: Normal skin temperature and turgor  2. Vascular: Dorsalis pedis two out of four bilateral and posterior tibial pulses two out of   four bilateral, capillary refill normal.  No ecchymosis or edema noted to left hallux. 3. Musculoskeletal: Tenderness with palpation at the medial aspect of the left hallux.    4. Neurological: Normal sharp dull sensation; reflexes normal.             ASSESSMENT AND PLAN:   Diagnoses and all orders for this visit:    Fracture  -     XR FOOT, COMPLETE (MIN 3 VIEWS), LEFT (CPT=73630); Future        Plan:     Patient seen and examined and findings discussed with patient. Reviewed left foot x-rays with patient with fracture site healing. Advised patient to avoid high-impact activities. She may transition out of the surgical shoe and into supportive shoe if she does not have any pain. Advised patient that if she does experience great pain with walking in a supportive shoe she may transition back into the surgical shoe after her next podiatry evaluation. Avoid walking barefoot. Take NSAIDs for pain as needed. Return to clinic in 5 weeks for repeat x-rays and reevaluation. The patient indicates understanding of these issues and agrees to the plan.         Vee Dillard DPM

## 2023-12-11 ENCOUNTER — OFFICE VISIT (OUTPATIENT)
Dept: PHYSICAL THERAPY | Facility: HOSPITAL | Age: 43
End: 2023-12-11
Attending: FAMILY MEDICINE
Payer: COMMERCIAL

## 2023-12-11 PROCEDURE — 97140 MANUAL THERAPY 1/> REGIONS: CPT

## 2023-12-11 PROCEDURE — 97110 THERAPEUTIC EXERCISES: CPT

## 2023-12-11 NOTE — PROGRESS NOTES
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 King Ortiz Date of Evaluation:    23    Precautions:  None Next MD visit:   none scheduled  Date of Surgery: n/a   Insurance Primary/Secondary: BCBS POS / N/A     # Auth Visits: 20 per POC           Subjective: The patient reports that she was released to go back into the shoe but the toe it is still broken. It feels better to walk normally. Pushing on the foam activates achiness, but then it goes away after she does the exercise. Wendseday she was on her feet for 12 hours straight and kind of got back some of the pain at the top of the foot when she first saw her podiatrist, but it went away after a couple of days whereas before it would have lasted longer. Pain:    2/10 with walking with purpose updated     Objective:       Knee to wall : R 3 cm (4 cm after posterior talar glides), L 10 cm  Purposeful walkin/10 pain, decreased to 1/2 /10 after ankle mobs    23: pain/pulling medial ankle with active dorsiflexion  DF: 5 with knee extended  Pain reproduced with resisted great toe flexion, palpation along flexor hallucis longus    23  Pain with active dorsiflexion (pulling medial ankle)  Hypomobility with lateral subtalar glide and great toe extension  Pt is able to perform 6 repetitions of a SL heel raise  Lumbar PA assessment: local pain at L4, L5 CPA and UPA CJ, this does not reproduce foot pain. Medial/cuadally directed UPA on R did not reproduce foot pain. DF: 5 with knee extended      Hip Knee Foot/Ankle   Flexion: R 5/5; L 5/5  Abduction: R 4+/5; L 4+/5  ER: R 4+/5; L 4+/5  IR: R 4+/5; L 5/5 Flexion: R 5/5; L 5/5 (screened sitting only)  Extension: R 5/5; L 5/5    DF: R 5/5; L 5/5  PF:   R 6; L unable to test due to fracture  INV: R 5/5; L 5/5  EV: R ; L          Assessment:   The patient was back to a regular shoe, so reassessed her quick/purposeful walking as this is her comparable sign, and evaluated a knee to wall test for dorsiflexion range of motion. The patient improved by 1 cm after posterior talar glides and then by another centimeter after long axis talocrural thrust manipulation. Instructed her to add her self MWM into DF with theraband back into her home exercise routine. Patient was instructed in and issued a HEP for: standing gastrocnemius stretch 2 x 25s, 2x daily; Inversion/eversion RTB 3 x 10 , slow ecc , heel raise 2 x 10-15;  SLS inversio bias  Pivot to mimic golf swing   Great toe flexion on sponge  Self STM to flexor hallucis longus         Goals:  (to be met in 20 visits)  Pt will demonstrate improved DF AROM to >= 10 degrees to promote proper foot clearance during gait and greater ease descending stairs without compensation (MET closed chain, upgraded to within 5 dg non-affected side)  Pt will increase plantar flexion strength B to be able to complete 5 SL heel raises for push off in gait (MET)  Pt will report <2/10 pain with work and home activities such as walking kids camps at work (progressing, less regions of pain)  Pt will be independent and compliant with comprehensive HEP to maintain progress achieved in PT (progressing)         Plan: consider repeat talocrural thrust   10/16/23  14/16 11/17/23  15/16 11/27/23  16/16 12/11/23  17/20   Therapeutic Exercise:   Tried SL heel raise with UE support, DC pt unable  DL heel raise with SL lower 5x, DC due to pain  Practiced DF stretch with black theraband around distal tibia 10x  Toe abduction 10x Therapeutic Exercise:   Reassessment of strength and ROM measures  SL heel raise 2 sets of 4-5 reps  Self stretch into rearfoot eversion Therapeutic Exercise:   Great toe flexion into manual resistance 10 sec hold 3x, then on sponge 10x.    Great toe extension stretch in sitting, 5x10 sec   Therapeutic Exercise:   DF MWM on box 2x10  Reassess comparable sign, walking quickly  Knee to wall stretch         Manual Therapy  G3 P-A and A-P ankle  STM to dorsum of foot between 3rd and 4th metatarsal   Tape to posterior tibialis Manual Therapy  PAIVM assessment of lumbar spine mobility  Foot/ankle physiologic and accessory glide assessment  Subtalar medial and lateral glide grade III in prone  X30 min Manual Therapy  Great toe flexion/extension passive stretching  Great toe dorsal accessory glides grade IV  STM to flexor hallucis longus for pain relief, taught pt self STM  X20 min Manual Therapy  Post talar glide grade III++ in dorsiflexed position- 1 cm improvement in knee to wall  Ant talar glide grade III++  Talocrural long axis distraction 2x  X30 min             Charges: Manual x2 (30' ); TE x 1 (12')  Total Timed Treatment: 42 min  Total Treatment Time: 42 min

## 2023-12-19 ENCOUNTER — PATIENT MESSAGE (OUTPATIENT)
Dept: FAMILY MEDICINE CLINIC | Facility: CLINIC | Age: 43
End: 2023-12-19

## 2023-12-20 NOTE — TELEPHONE ENCOUNTER
Future Appointments   Date Time Provider Vero Hooker   12/21/2023  4:45 PM Randy Cadet,  EMG 36 [id number]   12/22/2023  3:00 PM Sarah Valderrama, PT [address] [facility] PHYS [facility]   12/29/2023  3:00 PM Sarah Valderrama, PT [address] [facility] PHYS [facility]   1/5/2024  3:00 PM Sarah Valderrama, PT [address] [facility] PHYS [facility]   1/11/2024 10:50 AM Sarkis Peralta [id number]   1/12/2024  3:00 PM Sarah Valderrama, PT [address] [facility] PHYS [address]

## 2023-12-21 ENCOUNTER — OFFICE VISIT (OUTPATIENT)
Dept: FAMILY MEDICINE CLINIC | Facility: CLINIC | Age: 43
End: 2023-12-21
Payer: COMMERCIAL

## 2023-12-21 VITALS
DIASTOLIC BLOOD PRESSURE: 82 MMHG | HEART RATE: 114 BPM | BODY MASS INDEX: 41.95 KG/M2 | SYSTOLIC BLOOD PRESSURE: 174 MMHG | RESPIRATION RATE: 16 BRPM | WEIGHT: 293 LBS | HEIGHT: 70 IN | TEMPERATURE: 97 F

## 2023-12-21 DIAGNOSIS — J01.10 ACUTE NON-RECURRENT FRONTAL SINUSITIS: Primary | ICD-10-CM

## 2023-12-21 PROCEDURE — 3008F BODY MASS INDEX DOCD: CPT | Performed by: FAMILY MEDICINE

## 2023-12-21 PROCEDURE — 3077F SYST BP >= 140 MM HG: CPT | Performed by: FAMILY MEDICINE

## 2023-12-21 PROCEDURE — 99214 OFFICE O/P EST MOD 30 MIN: CPT | Performed by: FAMILY MEDICINE

## 2023-12-21 PROCEDURE — 3079F DIAST BP 80-89 MM HG: CPT | Performed by: FAMILY MEDICINE

## 2023-12-21 RX ORDER — DOXYCYCLINE HYCLATE 100 MG
100 TABLET ORAL 2 TIMES DAILY
Qty: 20 TABLET | Refills: 0 | Status: SHIPPED | OUTPATIENT
Start: 2023-12-21 | End: 2023-12-31

## 2023-12-22 ENCOUNTER — OFFICE VISIT (OUTPATIENT)
Dept: PHYSICAL THERAPY | Facility: HOSPITAL | Age: 43
End: 2023-12-22
Attending: FAMILY MEDICINE
Payer: COMMERCIAL

## 2023-12-22 PROCEDURE — 97140 MANUAL THERAPY 1/> REGIONS: CPT

## 2023-12-22 PROCEDURE — 97110 THERAPEUTIC EXERCISES: CPT

## 2023-12-22 NOTE — PROGRESS NOTES
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 Marcello Sheldon Date of Evaluation:    23    Precautions:  None Next MD visit:   none scheduled  Date of Surgery: n/a   Insurance Primary/Secondary: BCBS POS / N/A     # Auth Visits: 20 per POC           Subjective: The patient has been going for some walks. She did not get the top of the foot pain like she was getting before. She had some pain on the outside, but not as intense. Pain was on outside/top of foot. The walking was an improvement for top of foot pain. Pain:    1-2/10 with walking with purpose updated  (on dorsal lateral aspect of foot)    Objective:       Knee to wall : R 4 cm (4.5 cm after posterior talar glides), L 10 cm  Purposeful walkin/10 pain, decreased to 1/2 /10 after ankle mobs    23: pain/pulling medial ankle with active dorsiflexion  DF: 5 with knee extended  Pain reproduced with resisted great toe flexion, palpation along flexor hallucis longus    23  Pain with active dorsiflexion (pulling medial ankle)  Hypomobility with lateral subtalar glide and great toe extension  Pt is able to perform 6 repetitions of a SL heel raise  Lumbar PA assessment: local pain at L4, L5 CPA and UPA CJ, this does not reproduce foot pain. Medial/cuadally directed UPA on R did not reproduce foot pain. DF: 5 with knee extended      Hip Knee Foot/Ankle   Flexion: R 5/5; L 5/5  Abduction: R 4+/5; L 4+/5  ER: R 4+/5; L 4+/5  IR: R 4+/5; L 5/5 Flexion: R 5/5; L 5/5 (screened sitting only)  Extension: R 5/5; L 5/5    DF: R 5/5; L 5/5  PF:   R 6; L unable to test due to fracture  INV: R 5/5; L 5/5  EV: R 5/5; L 5/5         Assessment: The patient has noted an improvement in her top of foot pain with walking quickly.  Her dorsiflexion range of motion with knee to wall was improved compared to the prior session, however it is still limited compared to the other side. The patient had reproduction of her lateral foot pain with palpation to the cuboid. Performed cuboid mobilizations and a cuboid whip to address this. Patient was instructed in and issued a HEP for: standing gastrocnemius stretch 2 x 25s, 2x daily; Inversion/eversion RTB 3 x 10 , slow ecc , heel raise 2 x 10-15;  SLS inversio bias  Pivot to mimic golf swing   Great toe flexion on sponge  Self STM to flexor hallucis longus         Goals:  (to be met in 20 visits)  Pt will demonstrate improved DF AROM to >= 10 degrees to promote proper foot clearance during gait and greater ease descending stairs without compensation (MET closed chain, upgraded to within 5 dg non-affected side)  Pt will increase plantar flexion strength B to be able to complete 5 SL heel raises for push off in gait (MET)  Pt will report <2/10 pain with work and home activities such as walking kids camps at work (progressing, less regions of pain)  Pt will be independent and compliant with comprehensive HEP to maintain progress achieved in PT (progressing)         Plan: reassess knee to wall motion  10/16/23  14/16 11/17/23  15/16 11/27/23 16/16 12/11/23 17/20 12/22/23 18/20   Therapeutic Exercise:   Tried SL heel raise with UE support, DC pt unable  DL heel raise with SL lower 5x, DC due to pain  Practiced DF stretch with black theraband around distal tibia 10x  Toe abduction 10x Therapeutic Exercise:   Reassessment of strength and ROM measures  SL heel raise 2 sets of 4-5 reps  Self stretch into rearfoot eversion Therapeutic Exercise:   Great toe flexion into manual resistance 10 sec hold 3x, then on sponge 10x. Great toe extension stretch in sitting, 5x10 sec   Therapeutic Exercise:   DF MWM on box 2x10  Reassess comparable sign, walking quickly  Knee to wall stretch Therapeutic Exercise:    Foot on box MWM into dorsiflexion 20x  Discussion ways to stretch into dorsiflexion at home.            Manual Therapy  G3 P-A and A-P ankle  STM to dorsum of foot between 3rd and 4th metatarsal   Tape to posterior tibialis Manual Therapy  PAIVM assessment of lumbar spine mobility  Foot/ankle physiologic and accessory glide assessment  Subtalar medial and lateral glide grade III in prone  X30 min Manual Therapy  Great toe flexion/extension passive stretching  Great toe dorsal accessory glides grade IV  STM to flexor hallucis longus for pain relief, taught pt self STM  X20 min Manual Therapy  Post talar glide grade III++ in dorsiflexed position- 1 cm improvement in knee to wall  Ant talar glide grade III++  Talocrural long axis distraction 2x  X30 min Manual Therapy  Anterior talar glides grade IV  Talocrural distraction manipulation 2x  Post talar glides grade IV  Cuboid mobilizations in prone grade III  Cuboid whip 1x  X30 min                  Charges: Manual x2 (30' ); TE x 1 (13')  Total Timed Treatment: 43 min  Total Treatment Time: 43 min

## 2023-12-29 ENCOUNTER — APPOINTMENT (OUTPATIENT)
Dept: PHYSICAL THERAPY | Facility: HOSPITAL | Age: 43
End: 2023-12-29
Attending: FAMILY MEDICINE
Payer: COMMERCIAL

## 2024-01-05 ENCOUNTER — APPOINTMENT (OUTPATIENT)
Dept: PHYSICAL THERAPY | Facility: HOSPITAL | Age: 44
End: 2024-01-05
Attending: FAMILY MEDICINE
Payer: COMMERCIAL

## 2024-01-11 ENCOUNTER — HOSPITAL ENCOUNTER (OUTPATIENT)
Dept: GENERAL RADIOLOGY | Facility: HOSPITAL | Age: 44
Discharge: HOME | End: 2024-01-11
Attending: STUDENT IN AN ORGANIZED HEALTH CARE EDUCATION/TRAINING PROGRAM
Payer: COMMERCIAL

## 2024-01-11 ENCOUNTER — OFFICE VISIT (OUTPATIENT)
Dept: PODIATRY CLINIC | Facility: CLINIC | Age: 44
End: 2024-01-11

## 2024-01-11 DIAGNOSIS — S92.425D CLOSED NONDISPLACED FRACTURE OF DISTAL PHALANX OF LEFT GREAT TOE WITH ROUTINE HEALING, SUBSEQUENT ENCOUNTER: Primary | ICD-10-CM

## 2024-01-11 DIAGNOSIS — Z47.89 ORTHOPEDIC AFTERCARE: ICD-10-CM

## 2024-01-11 PROCEDURE — 73660 X-RAY EXAM OF TOE(S): CPT | Performed by: STUDENT IN AN ORGANIZED HEALTH CARE EDUCATION/TRAINING PROGRAM

## 2024-01-11 PROCEDURE — 99213 OFFICE O/P EST LOW 20 MIN: CPT | Performed by: STUDENT IN AN ORGANIZED HEALTH CARE EDUCATION/TRAINING PROGRAM

## 2024-01-11 NOTE — PROGRESS NOTES
[facility] Podiatry  Progress Note      Maryam Gu is a 43 year old female.   Chief Complaint   Patient presents with    Fracture     Left big toe f/u - has no c/o regarding her toe              HPI:     Patient is a pleasant 43-year-old female presents to clinic today for a follow-up of left great toe fracture.  She has been ambulating in supportive shoes with no pain.    Allergies: Penicillins    Current Outpatient Medications   Medication Sig Dispense Refill    Saline Nasal Spray 0.65 % Nasal Solution 1 spray by Nasal route as needed for congestion.      Cetirizine HCl (ZYRTEC ALLERGY OR) Take by mouth as needed.        Fluticasone Propionate (FLONASE ALLERGY RELIEF NA) by Nasal route as needed.          Past Medical History:   Diagnosis Date    Bronchitis       History reviewed. No pertinent surgical history.   History reviewed. No pertinent family history.   Social History     Socioeconomic History    Marital status:    Tobacco Use    Smoking status: Never    Smokeless tobacco: Never   Vaping Use    Vaping Use: Never used   Substance and Sexual Activity    Alcohol use: Yes     Comment: occas    Drug use: Never           REVIEW OF SYSTEMS:     Denies nause, fever, chills  No calf pain  Denies chest pain or SOB      EXAM:   [facility] 10/14/2023   GENERAL: well developed, well nourished, in no apparent distress  EXTREMITIES:   1. Integument: Normal skin temperature and turgor  2. Vascular: Dorsalis pedis two out of four bilateral and posterior tibial pulses two out of   four bilateral, capillary refill normal.   3. Musculoskeletal: No pain with palpation to left great toe.   4. Neurological: Normal sharp dull sensation; reflexes normal.             ASSESSMENT AND PLAN:   Diagnoses and all orders for this visit:    Closed nondisplaced fracture of distal phalanx of left great toe with routine healing, subsequent encounter    Other orders  -     XR TOE(S) (MIN 2 VIEWS), LEFT 1ST (CPT=73660);  Future        Plan:     Patient seen and examined and findings discussed with patient.  Reviewed left foot x-rays show healing of fracture site.  Patient may continue to walk in supportive shoes.  Avoid walking barefoot.  Advised patient to follow-up as needed.    The patient indicates understanding of these issues and agrees to the plan.        Kaylah Duarte DPM

## 2024-01-12 ENCOUNTER — APPOINTMENT (OUTPATIENT)
Dept: PHYSICAL THERAPY | Facility: HOSPITAL | Age: 44
End: 2024-01-12
Attending: FAMILY MEDICINE
Payer: COMMERCIAL

## 2024-01-12 ENCOUNTER — PATIENT MESSAGE (OUTPATIENT)
Dept: PHYSICAL THERAPY | Age: 44
End: 2024-01-12

## 2024-01-12 ENCOUNTER — TELEPHONE (OUTPATIENT)
Dept: PHYSICAL THERAPY | Facility: HOSPITAL | Age: 44
End: 2024-01-12

## 2024-01-19 ENCOUNTER — OFFICE VISIT (OUTPATIENT)
Dept: PHYSICAL THERAPY | Facility: HOSPITAL | Age: 44
End: 2024-01-19
Attending: FAMILY MEDICINE
Payer: COMMERCIAL

## 2024-01-19 PROCEDURE — 97140 MANUAL THERAPY 1/> REGIONS: CPT

## 2024-01-19 PROCEDURE — 97110 THERAPEUTIC EXERCISES: CPT

## 2024-01-19 NOTE — PROGRESS NOTES
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 Delgado Date of Evaluation:    6/29/23    Precautions:  None Next MD visit:   none scheduled  Date of Surgery: n/a   Insurance Primary/Secondary: BCBS POS / N/A     # Auth Visits: 20 per POC           Subjective: The patient reports that she has started going back to the gym. Things are still feeling pretty good on the outside anterior foot, but the front of the ankle part is becoming a nuisance again. With the knee to wall it gets stuck and it gets achy. She feels it with walking sometimes. Today is the first day she is in her orthotics that were ordered. Pt is going to a conference and will do a lot of walking, her next PT visit is after the conference. She states that in a prior PT session another therapist did a joint mobilization that made a big difference when her ankle popped.     Pain:    1-2/10 with walking with purpose updated 12/22 (on dorsal lateral aspect of foot)    Objective:       Knee to wall 12/11: R 3.5 cm (4 cm after ant talocrural glides), 4.5 after self posterior talar glides, 5 cm after ant calcaneal on posterior talar glide, then 6 cm after more ant calcaneal on post talar glide L 10 cm          Hip Knee Foot/Ankle   Flexion: R 5/5; L 5/5  Abduction: R 4+/5; L 4+/5  ER: R 4+/5; L 4+/5  IR: R 4+/5; L 5/5 Flexion: R 5/5; L 5/5 (screened sitting only)  Extension: R 5/5; L 5/5    DF: R 5/5; L 5/5  PF:   R 6; L unable to test due to fracture  INV: R 5/5; L 5/5  EV: R 5/5; L 5/5         Assessment: The patient has anterior ankle pain with dorsiflexion positioning and this continues to be stiff and painful, however the motion improves with mobilizations. Checked back in the chart and other therapist documented a P-A of the calcaneus. Performed an anterior glide of the calcaneus with a posterior talar glide  and the patient noted that this felt good, and her motion did improve throughout today's session. Will see how her ankle feels after going to her conference, and will have her focus solely on ankle mobilizations with movement into dorsiflexion - if she continues to have anterior ankle pain and stiffness, will have her follow up with MD for further evaluation.       Patient was instructed in and issued a HEP for: standing gastrocnemius stretch 2 x 25s, 2x daily;   Inversion/eversion RTB 3 x 10 , slow ecc , heel raise 2 x 10-15;  SLS inversio bias  Pivot to mimic golf swing   Great toe flexion on sponge  Self STM to flexor hallucis longus         Goals:  (to be met in 20 visits)  Pt will demonstrate improved DF AROM to >= 10 degrees to promote proper foot clearance during gait and greater ease descending stairs without compensation (MET closed chain, upgraded to within 5 dg non-affected side)  Pt will increase plantar flexion strength B to be able to complete 5 SL heel raises for push off in gait (MET)  Pt will report <2/10 pain with work and home activities such as walking kids camps at work (progressing, less regions of pain)  Pt will be independent and compliant with comprehensive HEP to maintain progress achieved in PT (progressing)         Plan: PN  11/17/23  15/16 11/27/23  16/16 12/11/23  17/20 12/22/23  18/20 1/19/2024  19/20   Therapeutic Exercise:   Reassessment of strength and ROM measures  SL heel raise 2 sets of 4-5 reps  Self stretch into rearfoot eversion Therapeutic Exercise:   Great toe flexion into manual resistance 10 sec hold 3x, then on sponge 10x.   Great toe extension stretch in sitting, 5x10 sec   Therapeutic Exercise:   DF MWM on box 2x10  Reassess comparable sign, walking quickly  Knee to wall stretch Therapeutic Exercise:   Foot on box MWM into dorsiflexion 20x  Discussion ways to stretch into dorsiflexion at home.  Therapeutic Exercise:   Instructed in MWM with theraband around ankle for  posterior talar glide, pt practiced 5x, discussed parameters for this at home          Manual Therapy  PAIVM assessment of lumbar spine mobility  Foot/ankle physiologic and accessory glide assessment  Subtalar medial and lateral glide grade III in prone  X30 min Manual Therapy  Great toe flexion/extension passive stretching  Great toe dorsal accessory glides grade IV  STM to flexor hallucis longus for pain relief, taught pt self STM  X20 min Manual Therapy  Post talar glide grade III++ in dorsiflexed position- 1 cm improvement in knee to wall  Ant talar glide grade III++  Talocrural long axis distraction 2x  X30 min Manual Therapy  Anterior talar glides grade IV  Talocrural distraction manipulation 2x  Post talar glides grade IV  Cuboid mobilizations in prone grade III  Cuboid whip 1x  X30 min     Manual Therapy  Talocrural PA grade IV++ multiple bouts t/o session  Subtalar calcaneal PA grade IV ++ multiple bouts t/o session  Recheck knee to wall multiple times t/o session  X30 min              Charges: Manual x2 (32' ); TE x 1 (8')  Total Timed Treatment: 40 min  Total Treatment Time: 40 min

## 2024-01-25 ENCOUNTER — APPOINTMENT (OUTPATIENT)
Dept: PHYSICAL THERAPY | Facility: HOSPITAL | Age: 44
End: 2024-01-25
Attending: FAMILY MEDICINE
Payer: COMMERCIAL

## 2024-02-02 ENCOUNTER — OFFICE VISIT (OUTPATIENT)
Dept: PHYSICAL THERAPY | Facility: HOSPITAL | Age: 44
End: 2024-02-02
Attending: FAMILY MEDICINE
Payer: COMMERCIAL

## 2024-02-02 PROCEDURE — 97140 MANUAL THERAPY 1/> REGIONS: CPT

## 2024-02-02 PROCEDURE — 97110 THERAPEUTIC EXERCISES: CPT

## 2024-02-02 NOTE — PROGRESS NOTES
Progress Summary  Pt has attended 20 visits in Physical Therapy.     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 Delgado Date of Evaluation:    6/29/23    Precautions:  None Next MD visit:   none scheduled  Date of Surgery: n/a   Insurance Primary/Secondary: BCBS POS / N/A     # Auth Visits: 22 per POC           Subjective: The patient states that she had 3 days of wakling around with no added pain or no re-activation of pain. She did not have more soreness than any general person who would be on their feet all day. She feels like she still does not have range of motion in the ankle, it is stiff. She notices this when she does certain things, such as bringing the heel back while sitting. She did the new stretch with the strap and did it every day, it helps. She has been wearing her inserts in her shoes.     Pain:    Achy medial ankle, no pain updated 2/2 (on dorsal lateral aspect of foot)    Objective:       Knee to wall 2/2: R 5 cm (4 cm after ant talocrural glides), 4 cm after anterior calcaneal glide, then back to 5 cm after posterior talar glide.  L 10 cm          Hip Knee Foot/Ankle   Flexion: R 5/5; L 5/5  Abduction: R 4+/5; L 4+/5  ER: R 4+/5; L 4+/5  IR: R 4+/5; L 5/5 Flexion: R 5/5; L 5/5 (screened sitting only)  Extension: R 5/5; L 5/5    DF: R 5/5; L 5/5  PF: R 9 (was 6); L 10  INV: R 5/5; L 5/5  EV: R 5/5; L 5/5         Assessment: the patient has noted improvements in her pain levels, she does continue to have stiffness in her talocrural joint, but she was able to walk long distances at a conference without limitation. Issued a self mobilization for dorsiflexion range of motion. Will plan for 2 more sessions to work towards and independent stretching routine, then DC to HEP.       Access Code: [email or web address]  URL: [email or web address]  Date:  02/02/2024  Prepared by: Moira Almendarez  - Gastroc Stretch on Wall  - 1 x daily - 7 x weekly - 1 sets - 3 reps - 30 sec hold  - Single Leg Heel Raise with Unilateral Counter Support  - 1 x daily - 7 x weekly - 3 sets - 5 reps         Goals:  (to be met in 22 visits)  Pt will demonstrate improved DF AROM to >= 10 degrees to promote proper foot clearance during gait and greater ease descending stairs without compensation (MET closed chain, upgraded to within 5 dg non-affected side)  Pt will increase plantar flexion strength B to be able to complete 5 SL heel raises for push off in gait (MET)  Pt will report <2/10 pain with work and home activities such as walking kids camps at work (MET, 1/10)  Pt will be independent and compliant with comprehensive HEP to maintain progress achieved in PT (progressing)         Plan: Continue skilled Physical Therapy 1 x/week or a total of 2 visits over a 90 day period. Treatment will include: manual therapy, therapeutic exercise, neuromuscular re-education, therapeutic activities, gait training       Patient/Family/Caregiver was advised of these findings, precautions, and treatment options and has agreed to actively participate in planning and for this course of care.    Thank you for your referral. If you have any questions, please contact me at Dept: [phone].    Sincerely,  Electronically signed by therapist: Moira Kaur, PT       Certification From: 2/3/2024  To:5/3/2024       Plan: talocrural mobilizations  11/27/23 16/16 12/11/23 17/20 12/22/23 18/20 1/19/2024 19/20 2/2/2024 20/20   Therapeutic Exercise:   Great toe flexion into manual resistance 10 sec hold 3x, then on sponge 10x.   Great toe extension stretch in sitting, 5x10 sec Therapeutic Exercise:   DF MWM on box 2x10  Reassess comparable sign, walking quickly  Knee to wall stretch Therapeutic Exercise:   Foot on box MWM into dorsiflexion 20x  Discussion ways to stretch into dorsiflexion at home.  Therapeutic  Exercise:   Instructed in MWM with theraband around ankle for posterior talar glide, pt practiced 5x, discussed parameters for this at home Therapeutic Exercise:   DF stretching on 6\" box, 10 sec hold, 10x ea  Reassessment goals, objective findings          Manual Therapy  Great toe flexion/extension passive stretching  Great toe dorsal accessory glides grade IV  STM to flexor hallucis longus for pain relief, taught pt self STM  X20 min Manual Therapy  Post talar glide grade III++ in dorsiflexed position- 1 cm improvement in knee to wall  Ant talar glide grade III++  Talocrural long axis distraction 2x  X30 min Manual Therapy  Anterior talar glides grade IV  Talocrural distraction manipulation 2x  Post talar glides grade IV  Cuboid mobilizations in prone grade III  Cuboid whip 1x  X30 min Manual Therapy  Talocrural PA grade IV++ multiple bouts t/o session  Subtalar calcaneal PA grade IV ++ multiple bouts t/o session  Recheck knee to wall multiple times t/o session  X30 min Manual Therapy  Talocrural PA grade IV++ multiple bouts t/o session  Subtalar calcaneal PA grade IV ++ multiple bouts t/o session  Recheck knee to wall multiple times t/o session  X28 min              Charges: Manual x2 (28' ); TE x 1 (11')  Total Timed Treatment: 39 min  Total Treatment Time: 39 min

## 2024-02-16 ENCOUNTER — OFFICE VISIT (OUTPATIENT)
Dept: PHYSICAL THERAPY | Facility: HOSPITAL | Age: 44
End: 2024-02-16
Attending: FAMILY MEDICINE
Payer: COMMERCIAL

## 2024-02-16 PROCEDURE — 97140 MANUAL THERAPY 1/> REGIONS: CPT

## 2024-02-16 PROCEDURE — 97110 THERAPEUTIC EXERCISES: CPT

## 2024-02-16 NOTE — PROGRESS NOTES
Discharge Summary  Pt has attended 21 visits in Physical Therapy.     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 Delgado Date of Evaluation:    6/29/23    Precautions:  None Next MD visit:   none scheduled  Date of Surgery: n/a   Insurance Primary/Secondary: BCBS POS / N/A     # Auth Visits: 22 per POC           Subjective: The patient reports that she is doing well, her foot is a little achy. She has been going to the gym and doing some ankle exercises (plantarflexion, leg press calf raises). Otherwise daily it seems fine. She has been doing the band stretch, it helps.     Pain:    Achy lateral foot,  updated 2/16    Objective:       Knee to wall 2/2: R 5 cm (5.5 cm after ant talocrural glides).  L 10 cm    DF open chain: R: 8    L: 15        Hip Knee Foot/Ankle   Flexion: R 5/5; L 5/5  Abduction: R 4+/5; L 4+/5  ER: R 4+/5; L 4+/5  IR: R 4+/5; L 5/5 Flexion: R 5/5; L 5/5 (screened sitting only)  Extension: R 5/5; L 5/5    DF: R 5/5; L 5/5  PF: R 9 (was 6); L 10  INV: R 5/5; L 5/5  EV: R 5/5; L 5/5         Assessment: The patient has demonstrated improvements in her pain levels, range of motion, and strength since starting physical therapy. She is no longer limited in her ability to perform daily activities due to limitation from her ankle. The patient does continue to have limitations in ankle range of motion, however this has improved in the last few weeks and has had considerable improvement since initial evaluation. At this time, she will be discharged to a home exercise program and will continue with her dorsiflexion stretching.      Access Code: [email or web address]  URL: [email or web address]  Date: 02/02/2024  Prepared by: Moira Almendarez  - Gastroc Stretch on Wall  - 1 x daily - 7 x weekly - 1 sets - 3 reps - 30 sec hold  - Single Leg Heel Raise with  Unilateral Counter Support  - 1 x daily - 7 x weekly - 3 sets - 5 reps         Goals:  (to be met in 22 visits)  Pt will demonstrate improved DF AROM to >= 10 degrees to promote proper foot clearance during gait and greater ease descending stairs without compensation (MET closed chain, upgraded to within 5 dg non-affected side)  Pt will increase plantar flexion strength B to be able to complete 5 SL heel raises for push off in gait (MET)  Pt will report <2/10 pain with work and home activities such as 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    Patient/Family/Caregiver was advised of these findings, precautions, and treatment options and has agreed to actively participate in planning and for this course of care.    Thank you for your referral. If you have any questions, please contact me at Dept: [phone].    Sincerely,  Electronically signed by therapist: Moira Kaur, PT       Certification From: 2/3/2024  To:5/3/2024       12/11/23  17/20 12/22/23  18/20 1/19/2024  19/20 2/2/2024  20/20 2/16/2024  21/22   Therapeutic Exercise:   DF MWM on box 2x10  Reassess comparable sign, walking quickly  Knee to wall stretch Therapeutic Exercise:   Foot on box MWM into dorsiflexion 20x  Discussion ways to stretch into dorsiflexion at home.  Therapeutic Exercise:   Instructed in MWM with theraband around ankle for posterior talar glide, pt practiced 5x, discussed parameters for this at home Therapeutic Exercise:   DF stretching on 6\" box, 10 sec hold, 10x ea  Reassessment goals, objective findings Therapeutic Exercise:   DF stretching on 6\" box, 20 sec hold, 5x ea  Tilt board DF stretching 3x30 sec            Manual Therapy  Post talar glide grade III++ in dorsiflexed position- 1 cm improvement in knee to wall  Ant talar glide  grade III++  Talocrural long axis distraction 2x  X30 min Manual Therapy  Anterior talar glides grade IV  Talocrural distraction manipulation 2x  Post talar glides grade IV  Cuboid mobilizations in prone grade III  Cuboid whip 1x  X30 min Manual Therapy  Talocrural PA grade IV++ multiple bouts t/o session  Subtalar calcaneal PA grade IV ++ multiple bouts t/o session  Recheck knee to wall multiple times t/o session  X30 min Manual Therapy  Talocrural PA grade IV++ multiple bouts t/o session  Subtalar calcaneal PA grade IV ++ multiple bouts t/o session  Recheck knee to wall multiple times t/o session  X28 min Manual Therapy  Talocrural PA grade IV++ multiple bouts t/o session  Calcaneal medial/lateral glide, for inversion/eversion  X25 min              Charges: Manual x2 (25' ); TE x 1 (15')  Total Timed Treatment: 40 min  Total Treatment Time: 40 min

## 2024-02-20 ENCOUNTER — APPOINTMENT (OUTPATIENT)
Dept: PHYSICAL THERAPY | Facility: HOSPITAL | Age: 44
End: 2024-02-20
Attending: FAMILY MEDICINE
Payer: COMMERCIAL

## 2024-03-08 ENCOUNTER — OFFICE VISIT (OUTPATIENT)
Dept: FAMILY MEDICINE CLINIC | Facility: CLINIC | Age: 44
End: 2024-03-08
Payer: COMMERCIAL

## 2024-03-08 VITALS
BODY MASS INDEX: 41.95 KG/M2 | TEMPERATURE: 97 F | WEIGHT: 293 LBS | HEART RATE: 83 BPM | RESPIRATION RATE: 18 BRPM | OXYGEN SATURATION: 99 % | SYSTOLIC BLOOD PRESSURE: 138 MMHG | HEIGHT: 70 IN | DIASTOLIC BLOOD PRESSURE: 80 MMHG

## 2024-03-08 DIAGNOSIS — Z12.31 ENCOUNTER FOR SCREENING MAMMOGRAM FOR MALIGNANT NEOPLASM OF BREAST: ICD-10-CM

## 2024-03-08 DIAGNOSIS — J22 ACUTE LOWER RESPIRATORY INFECTION: ICD-10-CM

## 2024-03-08 DIAGNOSIS — J22 ACUTE LOWER RESPIRATORY INFECTION: Primary | ICD-10-CM

## 2024-03-08 RX ORDER — AZITHROMYCIN 250 MG/1
TABLET, FILM COATED ORAL
Qty: 6 TABLET | Refills: 0 | Status: SHIPPED | OUTPATIENT
Start: 2024-03-08 | End: 2024-03-12

## 2024-03-08 RX ORDER — CODEINE PHOSPHATE AND GUAIFENESIN 10; 100 MG/5ML; MG/5ML
5 SOLUTION ORAL EVERY 6 HOURS PRN
Qty: 118 ML | Refills: 0 | Status: SHIPPED | OUTPATIENT
Start: 2024-03-08

## 2024-03-08 RX ORDER — CODEINE PHOSPHATE AND GUAIFENESIN 10; 100 MG/5ML; MG/5ML
5 SOLUTION ORAL EVERY 6 HOURS PRN
Qty: 118 ML | Refills: 0 | Status: SHIPPED | OUTPATIENT
Start: 2024-03-08 | End: 2024-03-08

## 2024-03-08 NOTE — PROGRESS NOTES
HPI:   Maryam Gu is a 43 year old female who presents for upper respiratory symptoms for  7  days. Patient reports sore throat only at the beginning of sx's, congestion, cough with yellow colored sputum, cough is keeping pt up at night, chest pain from coughing, OTC cold meds have not been helping, prior history of sinusitis, denies fever.    Current Outpatient Medications   Medication Sig Dispense Refill    azithromycin 250 MG Oral Tab Take 2 tablets (500 mg total) by mouth daily for 1 day, THEN 1 tablet (250 mg total) daily for 4 days. 6 tablet 0    guaiFENesin-codeine (CHERATUSSIN AC) 100-10 MG/5ML Oral Solution Take 5 mL by mouth every 6 (six) hours as needed for cough. 118 mL 0    Saline Nasal Spray 0.65 % Nasal Solution 1 spray by Nasal route as needed for congestion.      Cetirizine HCl (ZYRTEC ALLERGY OR) Take by mouth as needed.        Fluticasone Propionate (FLONASE ALLERGY RELIEF NA) by Nasal route as needed.          Past Medical History:   Diagnosis Date    Bronchitis       No past surgical history on file.   No family history on file.   Social History     Socioeconomic History    Marital status:    Tobacco Use    Smoking status: Never    Smokeless tobacco: Never   Vaping Use    Vaping Use: Never used   Substance and Sexual Activity    Alcohol use: Yes     Comment: occas    Drug use: Never         REVIEW OF SYSTEMS:   GENERAL: feels well otherwise  SKIN: no rashes  EYES:denies blurred vision or double vision  HEENT: congested; denies sore throat, ear pain, facial pain  LUNGS: denies shortness of breath with exertion; cough  CARDIOVASCULAR: denies chest pain on exertion  GI: no nausea or abdominal pain  NEURO: denies headaches    EXAM:   /80   Pulse 83   Temp 97.2 °F (36.2 °C) (Temporal)   Resp 18   Ht 5' 10\" (1.778 m)   Wt (!) 323 lb (146.5 kg)   LMP 02/28/2024   SpO2 99%   BMI 46.35 kg/m²   GENERAL: well developed, well nourished,in no apparent distress  SKIN: no  rashes,no suspicious lesions  EYES:PERRL, EOMI,conjunctiva are clear  HEENT: atraumatic, normocephalic,ears and throat are clear; no maxillary and frontal sinus tenderness  NECK: supple,no adenopathy  LUNGS: clear to auscultation, no r/r/w  CARDIO: RRR without murmur  GI: good BS's,no masses, HSM or tenderness    ASSESSMENT AND PLAN:   Maryam Gu is a 43 year old female who presents with  acute lower respiratory infection . PLAN: Z-joy, take as directed and cheratussin PRN .  Saline Rinse.  Tylenol or motrin as needed.  Antihistamine prn.  Fluids.  Probiotics advised.  Take abx with food.  Side effects discussed.  The patient indicates understanding of these issues and agrees to the plan.  The patient is asked to return if sx's persist or worsen.

## 2024-03-08 NOTE — TELEPHONE ENCOUNTER
Pt states the pharmacy we sent cough medicine to does not have it in stock. Pt was told that they can't transfer it because of the codine.     Please send to       [id number] [place], [place] - [address] AT [facility] [address], [phone], [phone]

## 2024-03-11 ENCOUNTER — PATIENT MESSAGE (OUTPATIENT)
Dept: FAMILY MEDICINE CLINIC | Facility: CLINIC | Age: 44
End: 2024-03-11

## 2024-03-11 RX ORDER — BENZONATATE 200 MG/1
200 CAPSULE ORAL 3 TIMES DAILY PRN
Qty: 20 CAPSULE | Refills: 0 | Status: SHIPPED | OUTPATIENT
Start: 2024-03-11

## 2024-03-11 NOTE — TELEPHONE ENCOUNTER
From: Maryam Gu  To: STEPHON MORRIS  Sent: 3/11/2024 11:57 AM CDT  Subject: Coughing    Hi Tricia Zacarias worked wonders and cleared out the thick, yellow mucous I was coughing up. However, yesterday and today... my cough is much more persistent like an irritated cough and each time I cough it makes me keep coughing. Each cough brings up just a tiny amount of ultra clear mucous. I'm out of town visiting family, but wonder if theres some other way to get the coughing tamed down. Be it a cough syrup or some other thing that tames down my bronchial system. Did one dose of that Guiafensisin/codiene the last two nights and that hasn't helped a little but not much.   If theres something else you recommend there is a [facility] where I'm at for the week.  [facility]  [address] [place] [address]

## 2024-10-07 ENCOUNTER — OFFICE VISIT (OUTPATIENT)
Dept: FAMILY MEDICINE CLINIC | Facility: CLINIC | Age: 44
End: 2024-10-07
Payer: COMMERCIAL

## 2024-10-07 VITALS
TEMPERATURE: 97 F | HEIGHT: 70 IN | WEIGHT: 293 LBS | SYSTOLIC BLOOD PRESSURE: 134 MMHG | HEART RATE: 82 BPM | RESPIRATION RATE: 16 BRPM | BODY MASS INDEX: 41.95 KG/M2 | DIASTOLIC BLOOD PRESSURE: 80 MMHG

## 2024-10-07 DIAGNOSIS — J01.10 ACUTE NON-RECURRENT FRONTAL SINUSITIS: Primary | ICD-10-CM

## 2024-10-07 PROCEDURE — 99214 OFFICE O/P EST MOD 30 MIN: CPT | Performed by: FAMILY MEDICINE

## 2024-10-07 RX ORDER — DOXYCYCLINE HYCLATE 100 MG
100 TABLET ORAL 2 TIMES DAILY
Qty: 20 TABLET | Refills: 0 | Status: SHIPPED | OUTPATIENT
Start: 2024-10-07 | End: 2024-10-17

## 2024-10-08 NOTE — PROGRESS NOTES
HPI:   Maryam Gu is a 43 year old female who presents for upper respiratory symptoms for  2  weeks. Patient reports sore throat only at the beginning of sx's, congestion, yellow colored nasal discharge, ear pain, sinus pain, denies fever, prior h/o sinusitis.    Current Outpatient Medications   Medication Sig Dispense Refill    Doxycycline Hyclate 100 MG Oral Tab Take 1 tablet (100 mg total) by mouth 2 (two) times daily for 10 days. 20 tablet 0    benzonatate 200 MG Oral Cap Take 1 capsule (200 mg total) by mouth 3 (three) times daily as needed for cough. 20 capsule 0    guaiFENesin-codeine (CHERATUSSIN AC) 100-10 MG/5ML Oral Solution Take 5 mL by mouth every 6 (six) hours as needed for cough. 118 mL 0    Saline Nasal Spray 0.65 % Nasal Solution 1 spray by Nasal route as needed for congestion.      Cetirizine HCl (ZYRTEC ALLERGY OR) Take by mouth as needed.        Fluticasone Propionate (FLONASE ALLERGY RELIEF NA) by Nasal route as needed.          Past Medical History:    Bronchitis      History reviewed. No pertinent surgical history.   History reviewed. No pertinent family history.   Social History     Socioeconomic History    Marital status:    Tobacco Use    Smoking status: Never    Smokeless tobacco: Never   Vaping Use    Vaping status: Never Used   Substance and Sexual Activity    Alcohol use: Yes     Comment: occas    Drug use: Never         REVIEW OF SYSTEMS:   GENERAL: feels well otherwise  SKIN: no rashes  EYES:denies blurred vision or double vision  HEENT: congested; sore throat, ear pain, facial pain  LUNGS: denies shortness of breath with exertion; cough  CARDIOVASCULAR: denies chest pain on exertion  GI: no nausea or abdominal pain  NEURO: + headaches    EXAM:   /80   Pulse 82   Temp 97.2 °F (36.2 °C) (Temporal)   Resp 16   Ht 5' 10\" (1.778 m)   Wt (!) 322 lb 9.6 oz (146.3 kg)   LMP 09/26/2024 (Exact Date)   BMI 46.29 kg/m²   GENERAL: well developed, well  nourished,in no apparent distress  SKIN: no rashes,no suspicious lesions  EYES:PERRL, EOMI,conjunctiva are clear  HEENT: atraumatic, normocephalic,ears and throat are clear; +frontal sinus tenderness  NECK: supple,no adenopathy  LUNGS: clear to auscultation, no r/r/w  CARDIO: RRR without murmur  GI: good BS's,no masses, HSM or tenderness    ASSESSMENT AND PLAN:   Maryam Gu is a 43 year old female who presents with Sinusitis. PLAN:  doxycycline 100mg BID x 10 days .  Saline Rinse.  Tylenol or motrin as needed.  Antihistamine prn.  Fluids.  Probiotics advised.  Take abx with food.  Side effects discussed.  The patient indicates understanding of these issues and agrees to the plan.  The patient is asked to return if sx's persist or worsen.

## 2025-04-18 ENCOUNTER — TELEPHONE (OUTPATIENT)
Dept: FAMILY MEDICINE CLINIC | Facility: CLINIC | Age: 45
End: 2025-04-18

## 2025-04-18 NOTE — TELEPHONE ENCOUNTER
Patient wants to know if it is ok to take benzonatate 200 MG Oral Cap even though it  in March.  Patient has cold symptoms again and if not ok to take she would like a refill

## 2025-04-18 NOTE — TELEPHONE ENCOUNTER
Patient's started last Monday, dry cough and runny nose. No fever. Covid test was negative. Uses Zyrtec and flonase with some relief. States that benzonatate helped with her cough in the past. She had an  one and I advised patient to not take it. Patient requesting for refill. Advised patient to go to [facility] for evaluation since the office is closed over the weekend. Otherwise, may call back on Monday to schedule with NP.

## 2025-06-06 NOTE — TELEPHONE ENCOUNTER
Has the office rc'd the fax and disc?
Pt asking for update. Please call thank you.
Voicemail left with results. Will try and call again early this week.
pt declined/done

## (undated) NOTE — LETTER
Patient Name: Nilo Crews  YOB: 1980          MRN :  [id number]  Date:  6/7/2021  Referring Physician:  Berto Ledezma    Progress/Discharge Summary  Pt has attended 12 visits in Physical Therapy.      Dx: Strain of lumbar re HEP and follow up PRN in next 2 months      Patient/Family/Caregiver was advised of these findings, precautions, and treatment options and has agreed to actively participate in planning and for this course of care.     Thank you for your referral. If you ha

## (undated) NOTE — ED AVS SNAPSHOT
Tammie Frazier   MRN: [id number]    Department:  [facility] Emergency Department   Date of Visit:  1/21/2018           Disclosure     Insurance plans vary and the physician(s) referred by the ER may not be covered by your plan.  Please contact your tell this physician (or your personal doctor if your instructions are to return to your personal doctor) about any new or lasting problems. The primary care or specialist physician will see patients referred from the [facility] Emergency Department.  Thomas Mendenhall

## (undated) NOTE — LETTER
AUTHORIZATION FOR SURGICAL OPERATION OR OTHER PROCEDURE    1. I hereby authorize Dr. Juan Hernández, and [facility] staff assigned to my case to perform the following operation and/or procedure at the [facility], [facility]:    _______________________________________________________________________________________________    Cortisone Injection Right Foot  _______________________________________________________________________________________________    2. My physician has explained the nature and purpose of the operation or other procedure, possible alternative methods of treatment, the risks involved, and the possibility of complication to me. I acknowledge that no guarantee has been made as to the result that may be obtained. 3.  I recognize that, during the course of this operation, or other procedure, unforseen conditions may necessitate additional or different procedure than those listed above. I, therefore, further authorize and request that the above named physician, his/her physician assistants or designees perform such procedures as are, in his/her professional opinion, necessary and desirable. 4.  Any tissue or organs removed in the operation or other procedure may be disposed of by and at the discretion of the [facility], [facility] and [facility] AT [facility]. 5.  I understand that in the event of a medical emergency, I will be transported by local paramedics to [facility] or other hospital emergency department. 6.  I certify that I have read and fully understand the above consent to operation and/or other procedure. 7.  I acknowledge that my physician has explained sedation/analgesia administration to me including the risks and benefits. I consent to the administration of sedation/analgesia as may be necessary or desirable in the judgement of my physician.     Witness signature: ___________________________________________________ Date:  ______/______/_____ Time:  ________ A. M.  P.M. Patient Name:  ______________________________________________________  (please print)      Patient signature:  ___________________________________________________             Relationship to Patient:           []  Parent    Responsible person                          []  Spouse  In case of minor or                    [] Other  _____________   Incompetent name:  __________________________________________________                               (please print)      _____________      Responsible person  In case of minor or  Incompetent signature:  _______________________________________________    Statement of Physician  My signature below affirms that prior to the time of the procedure, I have explained to the patient and/or his/her guardian, the risks and benefits involved in the proposed treatment and any reasonable alternative to the proposed treatment. I have also explained the risks and benefits involved in the refusal of the proposed treatment and have answered the patient's questions.                         Date:  ______/______/_______  Provider                      Signature:  __________________________________________________________       Time:  ___________ A.M    P.M.

## (undated) NOTE — LETTER
04/12/21        Leatha Linder 2      Dear French Almonte,    [address] [facility] records indicate that you have outstanding lab work and or testing that was ordered for you and has not yet been completed:  Orders Placed This [place]

## (undated) NOTE — ED AVS SNAPSHOT
[facility]    [address] [place] [address]    Phone:  [phone]    Fax:  [phone]           Lewis Puente   MRN: [id number]    Department:  [facility]   Date of Visit:  5/ [place], [address]  [phone] [address] [place] [address] [place] [address]   [phone]       To Check ER Wait Times:  TEXT 'Maxwell Salas' to [id number]      Click [email or web address]      Or call [phone]    If you have any problems with y phone number before you leave. After you leave, you should follow the attached instructions. I have read and understand the instructions given to me by my caregivers.         [facility]        Pharmacy Address Phone Number   [place] [address] [employer] will allow you to access patient instructions from your recent visit,  view other health information, and more. To sign up or find more information, go to [email or web address] and click on the Sign Up Now link in the [employer] box.      Enter

## (undated) NOTE — LETTER
Patient Name: Kellee Alcala  YOB: 1980          MRN :  [id number]  Date:  4/20/2021  Referring Physician:  Marty Gonzalez    Progress Summary  Pt has attended 8 visits in Physical Therapy.      Dx: Strain of lumbar region, init 4. The patient will report no difficulty looking up to see a bird on FOTO assessment PROGRESSING  5.  The patient will be independent and adherent in a comprehensive HEP PROGRESSING      Plan: Continue skilled Physical Therapy 1 x/week or a total of 4 visi

## (undated) NOTE — LETTER
Patient Name: Maryam Gu  YOB: 1980          MRN :  [id number]  Date:  2/16/2024  Referring Physician:  No ref. provider found    Discharge Summary  Pt has attended 21 visits in Physical Therapy.     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 Delgado Date of Evaluation:    6/29/23    Precautions:  None Next MD visit:   none scheduled  Date of Surgery: n/a   Insurance Primary/Secondary: [employer] POS / N/A     # Auth Visits: 22 per POC           Subjective: The patient reports that she is doing well, her foot is a little achy. She has been going to the gym and doing some ankle exercises (plantarflexion, leg press calf raises). Otherwise daily it seems fine. She has been doing the band stretch, it helps.     Pain:    Achy lateral foot,  updated 2/16    Objective:       Knee to wall 2/2: R 5 cm (5.5 cm after ant talocrural glides).  L 10 cm    DF open chain: R: 8    L: 15        Hip Knee Foot/Ankle   Flexion: R 5/5; L 5/5  Abduction: R 4+/5; L 4+/5  ER: R 4+/5; L 4+/5  IR: R 4+/5; L 5/5 Flexion: R 5/5; L 5/5 (screened sitting only)  Extension: R 5/5; L 5/5    DF: R 5/5; L 5/5  PF: R 9 (was 6); L 10  INV: R 5/5; L 5/5  EV: R 5/5; L 5/5         Assessment: The patient has demonstrated improvements in her pain levels, range of motion, and strength since starting physical therapy. She is no longer limited in her ability to perform daily activities due to limitation from her ankle. The patient does continue to have limitations in ankle range of motion, however this has improved in the last few weeks and has had considerable improvement since initial evaluation. At this time, she will be discharged to a home exercise program and will continue with her dorsiflexion stretching.      Access Code: [email or web address]  URL: [email or web address]  Date:  02/02/2024  Prepared by: Moira    Exercises  - Gastroc Stretch on Wall  - 1 x daily - 7 x weekly - 1 sets - 3 reps - 30 sec hold  - Single Leg Heel Raise with Unilateral Counter Support  - 1 x daily - 7 x weekly - 3 sets - 5 reps         Goals:  (to be met in 22 visits)  Pt will demonstrate improved DF AROM to >= 10 degrees to promote proper foot clearance during gait and greater ease descending stairs without compensation (MET closed chain, upgraded to within 5 dg non-affected side)  Pt will increase plantar flexion strength B to be able to complete 5 SL heel raises for push off in gait (MET)  Pt will report <2/10 pain with work and home activities such as 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    Patient/Family/Caregiver was advised of these findings, precautions, and treatment options and has agreed to actively participate in planning and for this course of care.    Thank you for your referral. If you have any questions, please contact me at Dept: [phone].    Sincerely,  Electronically signed by therapist: Moira Kaur, PT       Certification From: 2/3/2024  To:5/3/2024            21st Century Cures Act Notice to Patient: Medical documents like this are made available to patients in the interest of transparency. However, be advised this is a medical document and it is intended as [id number] communication between your medical providers. This medical document may contain abbreviations, assessments, medical data, and results or other terms that are unfamiliar. Medical documents are intended to carry relevant information, facts as evident, and the clinical opinion of the practitioner. As such, this medical document may be written in language that appears blunt or direct. You are encouraged to contact your medical  provider and/or [facility] if you have any questions about this medical document.

## (undated) NOTE — LETTER
AUTHORIZATION FOR SURGICAL OPERATION OR OTHER PROCEDURE    1. I hereby authorize Dr. Campbell Suarez, and [address] [place] [address] staff assigned to my case to perform the following operation and/or procedure at the [address] [place] [address]:    _______________________________________________________________________________________________    Cortisone Injection Right Foot  _______________________________________________________________________________________________    2. My physician has explained the nature and purpose of the operation or other procedure, possible alternative methods of treatment, the risks involved, and the possibility of complication to me. I acknowledge that no guarantee has been made as to the result that may be obtained. 3.  I recognize that, during the course of this operation, or other procedure, unforseen conditions may necessitate additional or different procedure than those listed above. I, therefore, further authorize and request that the above named physician, his/her physician assistants or designees perform such procedures as are, in his/her professional opinion, necessary and desirable. 4.  Any tissue or organs removed in the operation or other procedure may be disposed of by and at the discretion of the [address] [place] [address] and [facility]. 5.  I understand that in the event of a medical emergency, I will be transported by local paramedics to [facility] or other hospital emergency department. 6.  I certify that I have read and fully understand the above consent to operation and/or other procedure. 7.  I acknowledge that my physician has explained sedation/analgesia administration to me including the risks and benefits. I consent to the administration of sedation/analgesia as may be necessary or desirable in the judgement of my physician.     Witness signature: ___________________________________________________ Date:  ______/______/_____ Time:  ________ A. M.  P.M. Patient Name:  ______________________________________________________  (please print)      Patient signature:  ___________________________________________________             Relationship to Patient:           []  Parent    Responsible person                          []  Spouse  In case of minor or                    [] Other  _____________   Incompetent name:  __________________________________________________                               (please print)      _____________      Responsible person  In case of minor or  Incompetent signature:  _______________________________________________    Statement of Physician  My signature below affirms that prior to the time of the procedure, I have explained to the patient and/or his/her guardian, the risks and benefits involved in the proposed treatment and any reasonable alternative to the proposed treatment. I have also explained the risks and benefits involved in the refusal of the proposed treatment and have answered the patient's questions.                         Date:  ______/______/_______  Provider                      Signature:  __________________________________________________________       Time:  ___________ A.M    P.M.

## (undated) NOTE — LETTER
[facility] [address] [place] [address]  Dept: [phone]  Dept Fax: [phone]      May 5, 2017    Patient: Sony Bates   Date of Visit: 5/5/2017       To Whom It May Concern:    Sony Bates was seen and